# Patient Record
Sex: MALE | Race: ASIAN | Employment: STUDENT | URBAN - METROPOLITAN AREA
[De-identification: names, ages, dates, MRNs, and addresses within clinical notes are randomized per-mention and may not be internally consistent; named-entity substitution may affect disease eponyms.]

---

## 2024-06-11 RX ORDER — TRANEXAMIC ACID 10 MG/ML
1000 INJECTION, SOLUTION INTRAVENOUS ONCE
OUTPATIENT
Start: 2024-06-27 | End: 2024-06-11

## 2024-06-11 RX ORDER — MULTIVIT-MIN/IRON FUM/FOLIC AC 7.5 MG-4
1 TABLET ORAL DAILY
COMMUNITY

## 2024-06-27 ENCOUNTER — HOSPITAL ENCOUNTER (OUTPATIENT)
Facility: HOSPITAL | Age: 24
Setting detail: HOSPITAL OUTPATIENT SURGERY
Discharge: HOME | End: 2024-06-27
Attending: ORTHOPAEDIC SURGERY | Admitting: ORTHOPAEDIC SURGERY

## 2024-06-27 ENCOUNTER — ANESTHESIA EVENT (OUTPATIENT)
Dept: SURGERY | Facility: HOSPITAL | Age: 24
End: 2024-06-27

## 2024-06-27 ENCOUNTER — ANESTHESIA (OUTPATIENT)
Dept: SURGERY | Facility: HOSPITAL | Age: 24
End: 2024-06-27

## 2024-06-27 ENCOUNTER — APPOINTMENT (OUTPATIENT)
Dept: GENERAL RADIOLOGY | Facility: HOSPITAL | Age: 24
End: 2024-06-27
Attending: ORTHOPAEDIC SURGERY

## 2024-06-27 VITALS
OXYGEN SATURATION: 100 % | HEIGHT: 72 IN | BODY MASS INDEX: 23.84 KG/M2 | RESPIRATION RATE: 18 BRPM | WEIGHT: 176 LBS | HEART RATE: 104 BPM | DIASTOLIC BLOOD PRESSURE: 72 MMHG | TEMPERATURE: 98 F | SYSTOLIC BLOOD PRESSURE: 121 MMHG

## 2024-06-27 DIAGNOSIS — S73.192A TEAR OF LEFT ACETABULAR LABRUM, INITIAL ENCOUNTER: Primary | ICD-10-CM

## 2024-06-27 PROCEDURE — 0SQB4ZZ REPAIR LEFT HIP JOINT, PERCUTANEOUS ENDOSCOPIC APPROACH: ICD-10-PCS | Performed by: ORTHOPAEDIC SURGERY

## 2024-06-27 PROCEDURE — 0QB74ZZ EXCISION OF LEFT UPPER FEMUR, PERCUTANEOUS ENDOSCOPIC APPROACH: ICD-10-PCS | Performed by: ORTHOPAEDIC SURGERY

## 2024-06-27 PROCEDURE — 76000 FLUOROSCOPY <1 HR PHYS/QHP: CPT | Performed by: ORTHOPAEDIC SURGERY

## 2024-06-27 DEVICE — FG, CINCHLOCK SS ANCHOR WITH INSERTER
Type: IMPLANTABLE DEVICE | Site: HIP | Status: FUNCTIONAL
Brand: CINCHLOCK

## 2024-06-27 RX ORDER — ONDANSETRON 2 MG/ML
INJECTION INTRAMUSCULAR; INTRAVENOUS AS NEEDED
Status: DISCONTINUED | OUTPATIENT
Start: 2024-06-27 | End: 2024-06-27 | Stop reason: SURG

## 2024-06-27 RX ORDER — MEPERIDINE HYDROCHLORIDE 25 MG/ML
12.5 INJECTION INTRAMUSCULAR; INTRAVENOUS; SUBCUTANEOUS AS NEEDED
Status: DISCONTINUED | OUTPATIENT
Start: 2024-06-27 | End: 2024-06-27

## 2024-06-27 RX ORDER — SODIUM CHLORIDE, SODIUM LACTATE, POTASSIUM CHLORIDE, CALCIUM CHLORIDE 600; 310; 30; 20 MG/100ML; MG/100ML; MG/100ML; MG/100ML
INJECTION, SOLUTION INTRAVENOUS CONTINUOUS
Status: DISCONTINUED | OUTPATIENT
Start: 2024-06-27 | End: 2024-06-27

## 2024-06-27 RX ORDER — ACETAMINOPHEN 500 MG
1000 TABLET ORAL ONCE AS NEEDED
Status: DISCONTINUED | OUTPATIENT
Start: 2024-06-27 | End: 2024-06-27

## 2024-06-27 RX ORDER — HYDROCODONE BITARTRATE AND ACETAMINOPHEN 5; 325 MG/1; MG/1
2 TABLET ORAL ONCE AS NEEDED
Status: DISCONTINUED | OUTPATIENT
Start: 2024-06-27 | End: 2024-06-27

## 2024-06-27 RX ORDER — DEXAMETHASONE SODIUM PHOSPHATE 4 MG/ML
VIAL (ML) INJECTION AS NEEDED
Status: DISCONTINUED | OUTPATIENT
Start: 2024-06-27 | End: 2024-06-27 | Stop reason: SURG

## 2024-06-27 RX ORDER — NEOSTIGMINE METHYLSULFATE 1 MG/ML
INJECTION, SOLUTION INTRAVENOUS AS NEEDED
Status: DISCONTINUED | OUTPATIENT
Start: 2024-06-27 | End: 2024-06-27 | Stop reason: SURG

## 2024-06-27 RX ORDER — HYDROMORPHONE HYDROCHLORIDE 1 MG/ML
0.4 INJECTION, SOLUTION INTRAMUSCULAR; INTRAVENOUS; SUBCUTANEOUS EVERY 5 MIN PRN
Status: DISCONTINUED | OUTPATIENT
Start: 2024-06-27 | End: 2024-06-27

## 2024-06-27 RX ORDER — HYDROCODONE BITARTRATE AND ACETAMINOPHEN 5; 325 MG/1; MG/1
1 TABLET ORAL ONCE AS NEEDED
Status: DISCONTINUED | OUTPATIENT
Start: 2024-06-27 | End: 2024-06-27

## 2024-06-27 RX ORDER — ONDANSETRON 2 MG/ML
4 INJECTION INTRAMUSCULAR; INTRAVENOUS EVERY 6 HOURS PRN
Status: DISCONTINUED | OUTPATIENT
Start: 2024-06-27 | End: 2024-06-27

## 2024-06-27 RX ORDER — KETOROLAC TROMETHAMINE 30 MG/ML
INJECTION, SOLUTION INTRAMUSCULAR; INTRAVENOUS AS NEEDED
Status: DISCONTINUED | OUTPATIENT
Start: 2024-06-27 | End: 2024-06-27 | Stop reason: SURG

## 2024-06-27 RX ORDER — LABETALOL HYDROCHLORIDE 5 MG/ML
5 INJECTION, SOLUTION INTRAVENOUS EVERY 5 MIN PRN
Status: DISCONTINUED | OUTPATIENT
Start: 2024-06-27 | End: 2024-06-27

## 2024-06-27 RX ORDER — ACETAMINOPHEN 500 MG
1000 TABLET ORAL ONCE
Status: DISCONTINUED | OUTPATIENT
Start: 2024-06-27 | End: 2024-06-27 | Stop reason: HOSPADM

## 2024-06-27 RX ORDER — BUPIVACAINE HYDROCHLORIDE AND EPINEPHRINE 5; 5 MG/ML; UG/ML
INJECTION, SOLUTION EPIDURAL; INTRACAUDAL; PERINEURAL AS NEEDED
Status: DISCONTINUED | OUTPATIENT
Start: 2024-06-27 | End: 2024-06-27 | Stop reason: HOSPADM

## 2024-06-27 RX ORDER — HYDROMORPHONE HYDROCHLORIDE 1 MG/ML
INJECTION, SOLUTION INTRAMUSCULAR; INTRAVENOUS; SUBCUTANEOUS
Status: COMPLETED
Start: 2024-06-27 | End: 2024-06-27

## 2024-06-27 RX ORDER — TRANEXAMIC ACID 10 MG/ML
INJECTION, SOLUTION INTRAVENOUS AS NEEDED
Status: DISCONTINUED | OUTPATIENT
Start: 2024-06-27 | End: 2024-06-27 | Stop reason: SURG

## 2024-06-27 RX ORDER — GLYCOPYRROLATE 0.2 MG/ML
INJECTION, SOLUTION INTRAMUSCULAR; INTRAVENOUS AS NEEDED
Status: DISCONTINUED | OUTPATIENT
Start: 2024-06-27 | End: 2024-06-27 | Stop reason: SURG

## 2024-06-27 RX ORDER — ONDANSETRON 4 MG/1
4 TABLET, FILM COATED ORAL EVERY 8 HOURS PRN
Qty: 16 TABLET | Refills: 0 | Status: SHIPPED | OUTPATIENT
Start: 2024-06-27

## 2024-06-27 RX ORDER — MIDAZOLAM HYDROCHLORIDE 1 MG/ML
INJECTION INTRAMUSCULAR; INTRAVENOUS AS NEEDED
Status: DISCONTINUED | OUTPATIENT
Start: 2024-06-27 | End: 2024-06-27 | Stop reason: SURG

## 2024-06-27 RX ORDER — HYDROCODONE BITARTRATE AND ACETAMINOPHEN 5; 325 MG/1; MG/1
1 TABLET ORAL EVERY 6 HOURS PRN
Qty: 16 TABLET | Refills: 0 | Status: SHIPPED | OUTPATIENT
Start: 2024-06-27

## 2024-06-27 RX ORDER — SCOLOPAMINE TRANSDERMAL SYSTEM 1 MG/1
1 PATCH, EXTENDED RELEASE TRANSDERMAL ONCE
Status: DISCONTINUED | OUTPATIENT
Start: 2024-06-27 | End: 2024-06-27 | Stop reason: HOSPADM

## 2024-06-27 RX ORDER — HYDROMORPHONE HYDROCHLORIDE 1 MG/ML
0.2 INJECTION, SOLUTION INTRAMUSCULAR; INTRAVENOUS; SUBCUTANEOUS EVERY 5 MIN PRN
Status: DISCONTINUED | OUTPATIENT
Start: 2024-06-27 | End: 2024-06-27

## 2024-06-27 RX ORDER — PROCHLORPERAZINE EDISYLATE 5 MG/ML
INJECTION INTRAMUSCULAR; INTRAVENOUS
Status: COMPLETED
Start: 2024-06-27 | End: 2024-06-27

## 2024-06-27 RX ORDER — ASPIRIN 325 MG
325 TABLET, DELAYED RELEASE (ENTERIC COATED) ORAL DAILY
Qty: 21 TABLET | Refills: 0 | Status: SHIPPED | OUTPATIENT
Start: 2024-06-27

## 2024-06-27 RX ORDER — ROCURONIUM BROMIDE 10 MG/ML
INJECTION, SOLUTION INTRAVENOUS AS NEEDED
Status: DISCONTINUED | OUTPATIENT
Start: 2024-06-27 | End: 2024-06-27 | Stop reason: SURG

## 2024-06-27 RX ORDER — NAPROXEN 500 MG/1
500 TABLET ORAL 2 TIMES DAILY WITH MEALS
Qty: 42 TABLET | Refills: 1 | Status: SHIPPED | OUTPATIENT
Start: 2024-06-27

## 2024-06-27 RX ORDER — LOSARTAN POTASSIUM 25 MG/1
25 TABLET ORAL DAILY
Qty: 14 TABLET | Refills: 0 | Status: SHIPPED | OUTPATIENT
Start: 2024-06-27

## 2024-06-27 RX ORDER — PROCHLORPERAZINE EDISYLATE 5 MG/ML
5 INJECTION INTRAMUSCULAR; INTRAVENOUS EVERY 8 HOURS PRN
Status: DISCONTINUED | OUTPATIENT
Start: 2024-06-27 | End: 2024-06-27

## 2024-06-27 RX ORDER — HYDROMORPHONE HYDROCHLORIDE 1 MG/ML
0.6 INJECTION, SOLUTION INTRAMUSCULAR; INTRAVENOUS; SUBCUTANEOUS EVERY 5 MIN PRN
Status: DISCONTINUED | OUTPATIENT
Start: 2024-06-27 | End: 2024-06-27

## 2024-06-27 RX ORDER — MIDAZOLAM HYDROCHLORIDE 1 MG/ML
1 INJECTION INTRAMUSCULAR; INTRAVENOUS EVERY 5 MIN PRN
Status: DISCONTINUED | OUTPATIENT
Start: 2024-06-27 | End: 2024-06-27

## 2024-06-27 RX ORDER — NALOXONE HYDROCHLORIDE 0.4 MG/ML
0.08 INJECTION, SOLUTION INTRAMUSCULAR; INTRAVENOUS; SUBCUTANEOUS AS NEEDED
Status: DISCONTINUED | OUTPATIENT
Start: 2024-06-27 | End: 2024-06-27

## 2024-06-27 RX ORDER — LIDOCAINE HYDROCHLORIDE 10 MG/ML
INJECTION, SOLUTION EPIDURAL; INFILTRATION; INTRACAUDAL; PERINEURAL AS NEEDED
Status: DISCONTINUED | OUTPATIENT
Start: 2024-06-27 | End: 2024-06-27 | Stop reason: SURG

## 2024-06-27 RX ADMIN — MIDAZOLAM HYDROCHLORIDE 2 MG: 1 INJECTION INTRAMUSCULAR; INTRAVENOUS at 13:38:00

## 2024-06-27 RX ADMIN — SODIUM CHLORIDE, SODIUM LACTATE, POTASSIUM CHLORIDE, CALCIUM CHLORIDE: 600; 310; 30; 20 INJECTION, SOLUTION INTRAVENOUS at 13:38:00

## 2024-06-27 RX ADMIN — ROCURONIUM BROMIDE 50 MG: 10 INJECTION, SOLUTION INTRAVENOUS at 13:50:00

## 2024-06-27 RX ADMIN — SODIUM CHLORIDE, SODIUM LACTATE, POTASSIUM CHLORIDE, CALCIUM CHLORIDE: 600; 310; 30; 20 INJECTION, SOLUTION INTRAVENOUS at 16:40:00

## 2024-06-27 RX ADMIN — TRANEXAMIC ACID 1000 MG: 10 INJECTION, SOLUTION INTRAVENOUS at 14:23:00

## 2024-06-27 RX ADMIN — GLYCOPYRROLATE 0.6 MG: 0.2 INJECTION, SOLUTION INTRAMUSCULAR; INTRAVENOUS at 16:10:00

## 2024-06-27 RX ADMIN — NEOSTIGMINE METHYLSULFATE 4 MG: 1 INJECTION, SOLUTION INTRAVENOUS at 16:10:00

## 2024-06-27 RX ADMIN — KETOROLAC TROMETHAMINE 30 MG: 30 INJECTION, SOLUTION INTRAMUSCULAR; INTRAVENOUS at 16:11:00

## 2024-06-27 RX ADMIN — ONDANSETRON 4 MG: 2 INJECTION INTRAMUSCULAR; INTRAVENOUS at 15:53:00

## 2024-06-27 RX ADMIN — DEXAMETHASONE SODIUM PHOSPHATE 8 MG: 4 MG/ML VIAL (ML) INJECTION at 13:55:00

## 2024-06-27 RX ADMIN — LIDOCAINE HYDROCHLORIDE 30 MG: 10 INJECTION, SOLUTION EPIDURAL; INFILTRATION; INTRACAUDAL; PERINEURAL at 13:50:00

## 2024-06-27 NOTE — DISCHARGE INSTRUCTIONS
Paul Robrets MD  [facility]  Orthopaedic Surgery - Sports Medicine      Post Operative Instructions: Hip Arthroscopy with Labral Repair or Reconstruction    WEIGHT BEARING / MOVEMENT  You are FLAT FOOT weight bearing for the first 3 weeks after surgery and then 50% weight bearing for the 4th week; it is required that you use crutches for 4 weeks post-operatively to provide you with extra stability, to protect your hip, and to give the muscles around the hip the ability to rest. Do not hyperextend or hyperflex your hip, as this will be painful. Only move your hip within a pain-free range of motion.      The day after surgery, if you have access to a stationary bike, we encourage you to provide gentle motion to the hip. You may ride a stationary bike twice a day for 5-10 minutes (no resistance). This is only to provide motion to the hip and is not intended to stress the muscles around the hip girdle. Keep hip flexion to less than 90 degrees during these exercises.     Physical therapy will begin 7-10 days post-op after your first post-op visit.    ICE  You should use ice packs over the surgical site regularly throughout the day to help decrease swelling and pain. Make sure to have a layer between the ice and your knee so as to not injury your skin. Ice should be applied in intervals of 20-30 minutes on and off, and should be applied regularly for a minimum of 2 weeks following surgery. Do not apply ice to the area when you are asleep or at risk of falling asleep as     MEDICATIONS  An injection of local anesthesia was injected into your hip after the completion of the operation. This medication will wear in 6-12 hours. To control your pain during the transition while the anesthetic is wearing off, you should start the use of your pain medication, Norco, as you feel is needed.      You have also been given an anti-inflammatory called Naprosyn. You should begin taking this medication the night of surgery. You  are to take 500mg (1 tablet) two times a day. This medication serves two purposes: it will help reduce the amount of narcotic pain medication needed post-operatively, and it will prevent bone from re-growing (heterotopic ossification) around the hip. You are to take this medication for the first 21 days post-op.    A low dose of losartan has been prescribed to reduce the risk of post-operative fibrosis (scar tissue). This should be taken once daily for the first 2 weeks post-op.    Take Aspirin 325mg once daily starting the day after surgery for 3 weeks to help prevent the formation of blood clots.    If your are still having severe pain despite the Naprosyn, begin taking the pain medication (Norco) as prescribed.    An anti-nausea medication, Zofran, was prescribed for you and should be taken on an as needed basis, as the pain medication can cause nausea. To minimize this side effect, you should take your pain medication with some food.    DRESSING / BANDAGES:  Your hip dressing is NOT waterproof. Three days after surgery you may remove the hip dressing, and cover the areas with waterproof band-aids. It is then okay to shower with the waterproof band-aids. Do not scrub or wash with soap the area around the incisions for 3 weeks following surgery, just let water run over the areas. Similarly, during these 3 weeks, do not apply lotions or creams to the area around your incisions.     Do not take a bath or submerge your knee in water until your incisions are checked by me at your post-operative appointment and fully healed with all stitches removed. This is typically 2-3 weeks after surgery.    TEMPERATURE  It is normal to have an elevated temperature during the first 2-3 days post-operatively. Please call our office if your temperature is above 101F, if there is increased redness around the incision sites, or if there is increased drainage from the incision sites.    DRIVING  You may drive 2-3 weeks after surgery if  you are not taking narcotic pain medication. If your right leg is the operative side, then you must have good control of your leg prior to driving.    APPOINTMENT  It is likely that my surgical scheduler, Lou, has already scheduled a post-operative visit for you. This should occur 10-14 days after surgery. At that visit you will be given a prescription for physical therapy.    You make experience some low back pain due to muscle spasm from the traction applied during positioning to be able to perform your hip surgery. If so, apply a heating pad to the area of discomfort.    If you have any difficulty using the anti-inflammatory medications or aspirin or have a history of peptic ulcer disease, please let me know.    You received a drug called Toradol which is an Anti Inflammatory at: 4:10pm  If you are allowed to take Anti inflammatories:    Do not take any Anti Inflammatory like Motrin, Aleve or Ibuprofen until after: 10:10pm  Please report any suspected allergic reactions or bleeding issues to your doctor

## 2024-06-27 NOTE — ANESTHESIA POSTPROCEDURE EVALUATION
[facility] Мария Cortés Patient Status:  Hospital Outpatient Surgery   Age/Gender 24 year old male MRN [id number]   Location [facility] SURGERY Attending Paul Roberts MD   Hosp Day # 0 PCP Keron Deleon MD       Anesthesia Post-op Note    LEFT HIP ARTHROSCOPY, LABRAL REPAIR, FEMORAL NECK OSTEOPLASTY, CAPSULAR CLOSURE    Procedure Summary       Date: 06/27/24 Room / Location:  MAIN OR [address] [place] [address] MAIN OR    Anesthesia Start: 1338 Anesthesia Stop: 1640    Procedure: LEFT HIP ARTHROSCOPY, LABRAL REPAIR, FEMORAL NECK OSTEOPLASTY, CAPSULAR CLOSURE (Left: Hip) Diagnosis: (TORN ACETABULUM LEFT HIP; FEMORO-ACETABULAR IMPINGEMENT LEFT HIP)    Surgeons: Paul Roberts MD Anesthesiologist: John Kaiser MD    Anesthesia Type: general ASA Status: 1            Anesthesia Type: general    Vitals Value Taken Time   /67 06/27/24 1643   Temp 98.4 06/27/24 1643   Pulse 98 06/27/24 1643   Resp 16 06/27/24 1643   SpO2 100 06/27/24 1643       Patient Location: PACU    Anesthesia Type: general    Airway Patency: extubated    Postop Pain Control: adequate    Mental Status: mildly sedated but able to meaningfully participate in the post-anesthesia evaluation    Nausea/Vomiting: none    Cardiopulmonary/Hydration status: stable euvolemic    Complications: no apparent anesthesia related complications    Postop vital signs: stable    Dental Exam: Unchanged from Preop

## 2024-06-27 NOTE — H&P
ORTHOPEDIC SURGERY H&P     Patient Name:Rogelio Cortés  Date of Appointment: 6/27/2024    Chief Complaint: Patient presents with:  Left Hip - Follow - Up  Right Hip - Pain    HPI:   Rogelio Cortés is a 24 year old male who presents for evaluation of left hip pain that has been present for 6 months. Patient states that the pain began without injury or inciting incident. Since onset, symptoms have worsened. He notes pain predominately in the anterior hip. He notes pain with going up and down stairs and prolonged walking as well as attempted return to hiking . He denies significant component of associated back pain. He denies any associated radiating pain or nerve type symptoms down the ipsilateral or contralateral extremity distal to the knee. He endorses feelings of clicking or popping around the hip. He has attempted treatment with oral OTC antiinflammatory medication, oral prescription antiinflammatory medication, tylenol, physical therapy, and home exercise regimen. He has had MRI of the affected hip.    Interval history:  Patient comes in today for repeat evaluation of the left hip. He states that he had about 2 weeks of greater than 50% symptom relief following prior ultrasound-guided corticosteroid injection to the left hip. He has since had recurrence of symptoms and feels he has had no persistent long-lasting improvement in symptoms since onset now approximately 10 months ago. Also feels he has had worsening right-sided hip pain. This is predominantly an anterior and C sign distribution. This has been worsening over the past 6 months. He has been doing physical therapy as well as the oral anti-inflammatories which she has been taking for the contralateral hip without benefit. He does not have dedicated advanced imaging to the right hip.    Past Medical History  History reviewed. No pertinent past medical history.    Past Surgical History  Past Surgical History:   Procedure Laterality Date   TONSILLECTOMY      Medications    Current Outpatient Medications:   Meloxicam 15 MG Oral Tab, Take 1 tablet (15 mg total) by mouth daily., Disp: 30 tablet, Rfl: 0  Multiple Vitamins-Minerals (MULTIVITAMIN ADULTS OR), Take by mouth., Disp: , Rfl:   Adapalene (DIFFERIN) 0.3 % External Gel, Apply to face every night, rice sized amount mix with moisturizing cream., Disp: 45 g, Rfl: 3  ibuprofen 100 MG/5ML Oral Suspension, Take 200 mg by mouth every 4 (four) hours as needed for Fever., Disp: , Rfl:     Allergies    Latex HIVES, RASH  Augmentin [Amoxicil* RASH    Social History  Social History  Tobacco Use  Smoking status: Never  Smokeless tobacco: Not on file  Alcohol use: No  Drug use: No      Family History:  Family History   Problem Relation Age of Onset   Other (Other[other]) Mother   migrains   Diabetes Mother   Heart Disorder Maternal Grandmother   Heart Disorder Paternal Grandmother   Hypertension Paternal Grandmother   Heart Disorder Paternal Grandfather   Hypertension Paternal Grandfather     Review of Systems:   Constitutional: Denies fever, chills or weight loss   Allergies: As described in allergies  HEENT: Denies sore throat or acute eye problems  Respiratory: Denies shortness of breath   Cardiovascular: Denies chest pain or palpitations  GI: Denies abdominal pain, nausea  Skin: Denies rash   Neurologic: Denies headache or other problems  Musculoskeletal: As described in HPI  14 System Review of Systems otherwise negative     Physical Exam:     There were no vitals taken for this visit.     Constitutional: No acute distress. Well-nourished. Well-developed.  Head/Face: Normal appearance. Normocephalic. Atraumatic.  Respiratory: Normal Effort  Cardiovascular: warm, well-perfused distal extremities  Neurological: Oriented to time, place, and situation.  Psych: Normal mood, appropriate affect.    Musculoskeletal:    Hip Exam:  1/29/2024 1/29/2024    RIGHT Hip LEFT Hip   Atrophy None None   Skin Clean, dry, and intact. No  erythema or ecchymosis Clean, dry, and intact. No erythema or ecchymosis   Range of Motion 0 to 110, IR 10, ER 40 0 to 110, IR 10, ER 40   Tenderness  None None   Strength Normal Normal  Pain with resisted iliopsoas firing   Impingement Tests FE: Negative  FADDIR:Positive FE: Negative  FADDIR: Positive   Stinchfield Test Negative Negative   Rajwinder's Test Negative Negative   Israel Test Negative Negative   Sports hernia testing Not Tested Not Tested     Skin: Normal except where indicated above  Pulses: Symmetric palpable pulses distally unless indicated above  Lymph: No palpable lymph nodes on examined extremities    Diagnostic Studies:     3 views of the left hip including AP Pelvis, AP hip and lateral views demonstrate no acute fracture or dislocation. There is well-preserved femoroacetabular joint space without evidence of degenerative changes. There is evidence of femoroacetabular impingement, CAM type. Lateral center-edge angle of 26 degrees, Tonnis angle of 5 degrees, 4 mm of remaining femoroacetabular joint space, Alpha angle of 61 degrees. Tonnis grade 0 changes.    3 views of the right hip including AP Pelvis, lateral and false profile views demonstrate no acute fracture or dislocation. There is well-preserved femoroacetabular joint space without evidence of degenerative changes. There is evidence of femoroacetabular impingement, CAM type. Lateral center-edge angle of 25 degrees, Anterior center-edge angle of 31 degrees, Tonnis angle of 3 degrees, 5 mm of remaining femoroacetabular joint space, Alpha angle of 67 degrees. Tonnis grade 0 changes.    MRI of the left hip dated 1/6/2024 reviewed today demonstrates well-appearing femoral acetabular joint without evidence of degenerative change. There is small area of the anterior superior labrum concerning for possible partial-thickness undersurface labral tear only seen in 2 cuts without evidence of full-thickness extension. Well-appearing hip girdle  muscular structure without evidence of full-thickness tear or degenerative atrophy.    Assessment:     24 year old male presents with the following diagnoses:    1. Tear of left acetabular labrum, initial encounter    2. Femoroacetabular impingement of left hip    3. Femoroacetabular impingement of right hip    4. Pain of right hip  - XR HIP W OR WO PELVIS 2 OR 3 VIEWS, RIGHT (CPT=73502); Future  - MRI HIP ARTHROGRAM(CONTRAST ONLY), RT SH(CPT=73722); Future  - XR HIP ARTHROGRAM W/MRI RIGHT (CPT=27093/29439); Future    Plan:     Plan:   - To OR for left hip arthroscopy, labral repair, femoral neck osteoplasty, capsular closure    Paul Roberts MD  [facility]  Orthopedic Surgery, Sports Medicine

## 2024-06-27 NOTE — ANESTHESIA PROCEDURE NOTES
Airway  Date/Time: 6/27/2024 1:51 PM  Urgency: elective    Airway not difficult    General Information and Staff    Patient location during procedure: OR  Anesthesiologist: John Kaiser MD  Performed: anesthesiologist   Performed by: John Kaiser MD  Authorized by: John Kaiser MD      Indications and Patient Condition  Indications for airway management: anesthesia  Sedation level: deep  Preoxygenated: yes  Patient position: sniffing  Mask difficulty assessment: 1 - vent by mask    Final Airway Details  Final airway type: endotracheal airway      Successful airway: ETT  Cuffed: yes   Successful intubation technique: direct laryngoscopy  Endotracheal tube insertion site: oral  Blade: Reece  Blade size: #3  ETT size (mm): 7.5    Cormack-Lehane Classification: grade I - full view of glottis  Placement verified by: capnometry   Measured from: teeth  ETT to teeth (cm): 22  Number of attempts at approach: 1

## 2024-06-27 NOTE — ANESTHESIA PREPROCEDURE EVALUATION
PRE-OP EVALUATION    Patient Name: Rogelio Cortés    Admit Diagnosis: TORN ACETABULUM LEFT HIP; FEMORO-ACETABULAR IMPINGEMENT LEFT HIP    Pre-op Diagnosis: TORN ACETABULUM LEFT HIP; FEMORO-ACETABULAR IMPINGEMENT LEFT HIP    LEFT HIP ARTHROSCOPY, LABRAL REPAIR, FEMORAL NECK OSTEOPLASTY, CAPSULAR CLOSURE    Anesthesia Procedure: LEFT HIP ARTHROSCOPY, LABRAL REPAIR, FEMORAL NECK OSTEOPLASTY, CAPSULAR CLOSURE (Left)    Surgeons and Role:     * Paul Roberts MD - Primary    Pre-op vitals reviewed.        Body mass index is 21.7 kg/m².    Current medications reviewed.  Hospital Medications:  No current facility-administered medications on file as of .       Outpatient Medications:     No medications prior to admission.       Allergies: Latex and Augmentin [amoxicillin-pot clavulanate]      Anesthesia Evaluation    Patient summary reviewed.    Anesthetic Complications  (-) history of anesthetic complications         GI/Hepatic/Renal                                 Cardiovascular        Exercise tolerance: good     MET: >4                                           Endo/Other                                  Pulmonary                           Neuro/Psych                   (+) headaches           Migraines  Hx of motion sickness            Past Surgical History:   Procedure Laterality Date    Adenoidectomy      Tonsillectomy      [place] teeth removed       Social History     Socioeconomic History    Marital status: Single   Tobacco Use    Smoking status: Never   Vaping Use    Vaping status: Never Used   Substance and Sexual Activity    Alcohol use: No    Drug use: No     History   Drug Use No     Available pre-op labs reviewed.               Airway      Mallampati: I  Mouth opening: >3 FB  TM distance: 4 - 6 cm  Neck ROM: full Cardiovascular      Rhythm: regular  Rate: normal     Dental    Dentition appears grossly intact         Pulmonary      Breath sounds clear to auscultation bilaterally.               Other findings               ASA: 1   Plan: general  NPO status verified and patient meets guidelines.    Post-procedure pain management plan discussed with surgeon and patient.      Plan/risks discussed with: patient, father and mother                Present on Admission:  **None**

## 2024-06-28 NOTE — BRIEF OP NOTE
Pre-Operative Diagnosis: TORN ACETABULUM LEFT HIP; FEMORO-ACETABULAR IMPINGEMENT LEFT HIP     Post-Operative Diagnosis: TORN ACETABULUM LEFT HIP; FEMORO-ACETABULAR IMPINGEMENT LEFT HIP      Procedure Performed:   LEFT HIP ARTHROSCOPY, LABRAL REPAIR, FEMORAL NECK OSTEOPLASTY, CAPSULAR CLOSURE (95820, [id number])    Surgeons and Role:     * Paul Roberts MD - Primary    Assistant(s):  PA: Wayne Laura PA-C     Surgical Findings: See detailed operative report     Specimen: None     Estimated Blood Loss: Blood Output: 25 mL (6/27/2024  4:11 PM)    Paul Roberts MD  6/27/2024  9:01 PM

## 2024-06-28 NOTE — OPERATIVE REPORT
Pre-Operative Diagnosis: LEFT HIP IMPINGEMENT SYNDROME, TEAR OF ACETABULAR LABRUM LEFT HIP     Post-Operative Diagnosis: LEFT HIP IMPINGEMENT SYNDROME, TEAR OF ACETABULAR LABRUM LEFT HIP      Procedure Performed:   LEFT HIP ARTHROSCOPY, LABRAL REPAIR, FEMORAL NECK OSTEOPLASTY, CAPSULAR CLOSURE (02809, [id number])     Surgeon(s) and Role:     * Paul Roberts MD - Primary     Assistant(s):  PA: Wayne Laura PA-C     Skilled assistance was needed for patient positioning, prepping and draping, instrument holding and passing, retracting, and suturing.     Specimen: None     Estimated Blood Loss: Blood Output: 25 mL      Indications for Procedure:  Rogelio Cortés is a 24 year old male with 8 months of left hip pain that was found on radiographs to have well-preserved joint space significant and evidence of femoral acetabular impingement with small left sided cam deformity.  An MRI demonstrated the presence of the femoral acetabular impingement as well as a tear of the left acetabular labrum and well preserved chondral surfaces within the hip joint.  Rogelio had previously exhausted conservative measures including activity modification as well as physical therapy without improvement in his hip symptoms. We discussed risks, benefits, and alternatives to surgical intervention and the patient wished to proceed with right hip arthroscopic labral repair, femoral neck osteoplasty, and capsular closure.  Informed consent was obtained.     Description of Procedure:  The patient was met in the preoperative holding area where correct patient, correct site, correct side, correct procedure were all confirmed.  The patient was placed under anesthesia and placed in a supine position on the [place] orthopaedic table.  Preoperative antibiotics were given.  A preoperative timeout was completed to WHO standards. All bony prominences were padded.  Traction was placed onto the nonoperative leg in a stabilizing position then gross traction was  applied to the right lower extremity.  An air arthrogram was performed under sterile conditions and confirmed under intraoperative fluoroscopy with atraumatic distention of the joint visualized.  Final traction was then applied with greater than 10 mm of joint distention visualized fluoroscopically.  The hip was then prepped and draped in the usual sterile surgical fashion and shower curtain dressing applied to the operative side.  A standard anterior lateral portal was then used to establish access into the joint under fluoroscopic guidance so as to confirm that the labrum was not violated.  The Semasio cannulated access system was then used and a 5.0 mm cannula was inserted over Nitinol guidewire under fluoroscopic guidance.  The Nitinol wire was removed and found to be intact.  70 degree arthroscope was then inserted and the joint surface visualized.  A mid anterior portal was then established under fluoroscopic guidance with direct visualization as the needle entered the joint.  A 5.0 mm cannula was then inserted over Nitinol guidewire for establishment of the mid anterior portal.  The camera was placed into the mid anterior portal and anterior lateral portal placement was confirmed to be through the capsule and outside of the acetabular labrum.  Camera was then brought back to the anterior lateral portal and samurai blade was inserted into the mid anterior portal for interportal capsulotomy.  The camera was then exchanged and the samurai placed into the anterior lateral portal for pericapsulotomy.  A labral tear was then visualized in the 1:00 to 3 o'clock position without associated injury to the chondral surface of the acetabular dome at the chondral labral junction.  Shaver was introduced and the labral tear was debrided down to a stable base.  Injector was then used to place retraction stitches in the acetabular leaflet of the incised capsule. The ablator was then introduced and the superior reflection of  the capsular leaflet was reflected off of the superior acetabulum adjacent to the labrum.     At this time attention was turned to the labral repair.  A distal anterior lateral accessory portal was then established under needle localization and fluoroscopic guidance.  A access cannula was then introduced over Nitinol wire via [place]v cannulated access system.  A 1.0 mm labral tape was then passed circumferentially around the labrum and without destabilizing the labral chondral junction.  This was placed into an anchor, and anchor socket was then drilled adjacent to the labral tape superiorly in the anterior acetabulum under direct visualization with no noted injury to the acetabular cartilage surface.  The anchor was then introduced into the joint and secured in place with appropriate eversion of the acetabular labrum.  This process was completed sequentially and a 3 anchor repair was performed. At this time the labrum was deemed to be appropriately repaired and peripheral borders were stable.  Final debridement of the chondral labral junction was completed with the use of shaver and electrocautery.  Traction was let down and appropriate suction seal was confirmed.  Total traction time was 58 minutes.      Attention was then turned to the femoral neck osteoplasty.  With the use of 5.0 mm round bur and intraoperative fluoroscopic guidance the hip was taken through a range of motion and a femoral neck was debrided back down to an appropriate level and contour to reestablish the appropriate spherical femoral orientation.  Evaluation was completed at sequential positions of 0 degrees of flexion and neutral rotation, 30 degrees of internal and external rotation, 50 degrees of flexion and neutral rotation, 50 degrees of flexion and 40 and 60 degrees of external rotation.  Upon completion of the resection the appropriate head neck offset and contour had been reestablished in all planes and normal alpha angle was achieved.   Attention was then turned to capsular closure.  The injector was then used to place simple stitches in the acetabular and femoral leaflets of the prior periportal capsulotomy and closed in sequential fashion. The joint was then suctioned and capsular incisions were injected with 0.25% Marcaine with epinephrine.  Incisions were then closed with 3-0 nylon in interrupted fashion and sterile dressings were placed.  The patient was awakened from anesthesia, transferred to the postoperative care unit and left the operating room in stable condition.  There were no complications.    Paul Roberts MD  06/27/2024

## 2024-09-24 PROBLEM — M25.552 JOINT PAIN OF LEFT HIP ON MOVEMENT: Status: ACTIVE | Noted: 2023-11-09

## 2024-09-24 PROBLEM — L70.0 ACNE VULGARIS: Status: ACTIVE | Noted: 2021-12-22

## 2024-09-24 PROBLEM — G43.909 MIGRAINE: Status: ACTIVE | Noted: 2021-12-22

## 2024-09-24 PROBLEM — M25.559 PAIN IN JOINT, PELVIC REGION AND THIGH: Status: ACTIVE | Noted: 2023-12-28

## 2024-09-24 PROBLEM — S73.192A TEAR OF LEFT ACETABULAR LABRUM: Status: ACTIVE | Noted: 2024-03-22

## 2024-10-10 ENCOUNTER — APPOINTMENT (OUTPATIENT)
Dept: GENERAL RADIOLOGY | Facility: HOSPITAL | Age: 24
End: 2024-10-10
Attending: ORTHOPAEDIC SURGERY
Payer: COMMERCIAL

## 2024-10-10 ENCOUNTER — HOSPITAL ENCOUNTER (OUTPATIENT)
Facility: HOSPITAL | Age: 24
Setting detail: HOSPITAL OUTPATIENT SURGERY
Discharge: HOME | End: 2024-10-10
Attending: ORTHOPAEDIC SURGERY | Admitting: ORTHOPAEDIC SURGERY
Payer: COMMERCIAL

## 2024-10-10 ENCOUNTER — ANESTHESIA (OUTPATIENT)
Dept: SURGERY | Facility: HOSPITAL | Age: 24
End: 2024-10-10
Payer: COMMERCIAL

## 2024-10-10 ENCOUNTER — ANESTHESIA EVENT (OUTPATIENT)
Dept: SURGERY | Facility: HOSPITAL | Age: 24
End: 2024-10-10
Payer: COMMERCIAL

## 2024-10-10 VITALS
HEIGHT: 72 IN | RESPIRATION RATE: 16 BRPM | BODY MASS INDEX: 23.68 KG/M2 | TEMPERATURE: 98 F | OXYGEN SATURATION: 100 % | HEART RATE: 102 BPM | WEIGHT: 174.81 LBS | SYSTOLIC BLOOD PRESSURE: 140 MMHG | DIASTOLIC BLOOD PRESSURE: 93 MMHG

## 2024-10-10 DIAGNOSIS — S73.192A TEAR OF LEFT ACETABULAR LABRUM, INITIAL ENCOUNTER: ICD-10-CM

## 2024-10-10 DIAGNOSIS — S73.191A TEAR OF RIGHT ACETABULAR LABRUM, INITIAL ENCOUNTER: Primary | ICD-10-CM

## 2024-10-10 PROCEDURE — 76000 FLUOROSCOPY <1 HR PHYS/QHP: CPT | Performed by: ORTHOPAEDIC SURGERY

## 2024-10-10 PROCEDURE — 0QB64ZZ EXCISION OF RIGHT UPPER FEMUR, PERCUTANEOUS ENDOSCOPIC APPROACH: ICD-10-PCS | Performed by: ORTHOPAEDIC SURGERY

## 2024-10-10 PROCEDURE — 0QB44ZZ EXCISION OF RIGHT ACETABULUM, PERCUTANEOUS ENDOSCOPIC APPROACH: ICD-10-PCS | Performed by: ORTHOPAEDIC SURGERY

## 2024-10-10 PROCEDURE — 76942 ECHO GUIDE FOR BIOPSY: CPT | Performed by: ANESTHESIOLOGY

## 2024-10-10 DEVICE — FG, CINCHLOCK SS ANCHOR WITH INSERTER
Type: IMPLANTABLE DEVICE | Site: HIP | Status: FUNCTIONAL
Brand: CINCHLOCK

## 2024-10-10 RX ORDER — KETOROLAC TROMETHAMINE 30 MG/ML
INJECTION, SOLUTION INTRAMUSCULAR; INTRAVENOUS AS NEEDED
Status: DISCONTINUED | OUTPATIENT
Start: 2024-10-10 | End: 2024-10-10 | Stop reason: SURG

## 2024-10-10 RX ORDER — HYDROMORPHONE HYDROCHLORIDE 1 MG/ML
0.4 INJECTION, SOLUTION INTRAMUSCULAR; INTRAVENOUS; SUBCUTANEOUS EVERY 5 MIN PRN
Status: DISCONTINUED | OUTPATIENT
Start: 2024-10-10 | End: 2024-10-10

## 2024-10-10 RX ORDER — TRANEXAMIC ACID 10 MG/ML
INJECTION, SOLUTION INTRAVENOUS AS NEEDED
Status: DISCONTINUED | OUTPATIENT
Start: 2024-10-10 | End: 2024-10-10 | Stop reason: SURG

## 2024-10-10 RX ORDER — DEXAMETHASONE SODIUM PHOSPHATE 4 MG/ML
VIAL (ML) INJECTION AS NEEDED
Status: DISCONTINUED | OUTPATIENT
Start: 2024-10-10 | End: 2024-10-10 | Stop reason: SURG

## 2024-10-10 RX ORDER — ROCURONIUM BROMIDE 10 MG/ML
INJECTION, SOLUTION INTRAVENOUS AS NEEDED
Status: DISCONTINUED | OUTPATIENT
Start: 2024-10-10 | End: 2024-10-10 | Stop reason: SURG

## 2024-10-10 RX ORDER — NAPROXEN 500 MG/1
500 TABLET ORAL 2 TIMES DAILY
Qty: 42 TABLET | Refills: 1 | Status: SHIPPED | OUTPATIENT
Start: 2024-10-10

## 2024-10-10 RX ORDER — HYDROMORPHONE HYDROCHLORIDE 1 MG/ML
INJECTION, SOLUTION INTRAMUSCULAR; INTRAVENOUS; SUBCUTANEOUS
Status: DISCONTINUED
Start: 2024-10-10 | End: 2024-10-10 | Stop reason: WASHOUT

## 2024-10-10 RX ORDER — SODIUM CHLORIDE, SODIUM LACTATE, POTASSIUM CHLORIDE, CALCIUM CHLORIDE 600; 310; 30; 20 MG/100ML; MG/100ML; MG/100ML; MG/100ML
INJECTION, SOLUTION INTRAVENOUS CONTINUOUS
Status: DISCONTINUED | OUTPATIENT
Start: 2024-10-10 | End: 2024-10-10

## 2024-10-10 RX ORDER — HYDROCODONE BITARTRATE AND ACETAMINOPHEN 5; 325 MG/1; MG/1
1 TABLET ORAL EVERY 6 HOURS PRN
Qty: 16 TABLET | Refills: 0 | Status: SHIPPED | OUTPATIENT
Start: 2024-10-10

## 2024-10-10 RX ORDER — LABETALOL HYDROCHLORIDE 5 MG/ML
5 INJECTION, SOLUTION INTRAVENOUS EVERY 5 MIN PRN
Status: DISCONTINUED | OUTPATIENT
Start: 2024-10-10 | End: 2024-10-10

## 2024-10-10 RX ORDER — SCOLOPAMINE TRANSDERMAL SYSTEM 1 MG/1
1 PATCH, EXTENDED RELEASE TRANSDERMAL ONCE
Status: DISCONTINUED | OUTPATIENT
Start: 2024-10-10 | End: 2024-10-10

## 2024-10-10 RX ORDER — ONDANSETRON 4 MG/1
4 TABLET, FILM COATED ORAL EVERY 8 HOURS PRN
Qty: 16 TABLET | Refills: 0 | Status: SHIPPED | OUTPATIENT
Start: 2024-10-10

## 2024-10-10 RX ORDER — LIDOCAINE HYDROCHLORIDE 10 MG/ML
INJECTION, SOLUTION EPIDURAL; INFILTRATION; INTRACAUDAL; PERINEURAL AS NEEDED
Status: DISCONTINUED | OUTPATIENT
Start: 2024-10-10 | End: 2024-10-10 | Stop reason: SURG

## 2024-10-10 RX ORDER — HYDROCODONE BITARTRATE AND ACETAMINOPHEN 5; 325 MG/1; MG/1
1 TABLET ORAL ONCE AS NEEDED
Status: COMPLETED | OUTPATIENT
Start: 2024-10-10 | End: 2024-10-10

## 2024-10-10 RX ORDER — HYDROCODONE BITARTRATE AND ACETAMINOPHEN 5; 325 MG/1; MG/1
2 TABLET ORAL ONCE AS NEEDED
Status: COMPLETED | OUTPATIENT
Start: 2024-10-10 | End: 2024-10-10

## 2024-10-10 RX ORDER — ONDANSETRON 2 MG/ML
4 INJECTION INTRAMUSCULAR; INTRAVENOUS EVERY 6 HOURS PRN
Status: DISCONTINUED | OUTPATIENT
Start: 2024-10-10 | End: 2024-10-10

## 2024-10-10 RX ORDER — ACETAMINOPHEN 500 MG
1000 TABLET ORAL ONCE
Status: DISCONTINUED | OUTPATIENT
Start: 2024-10-10 | End: 2024-10-10 | Stop reason: HOSPADM

## 2024-10-10 RX ORDER — ALBUTEROL SULFATE 0.83 MG/ML
2.5 SOLUTION RESPIRATORY (INHALATION) AS NEEDED
Status: DISCONTINUED | OUTPATIENT
Start: 2024-10-10 | End: 2024-10-10

## 2024-10-10 RX ORDER — ACETAMINOPHEN 500 MG
1000 TABLET ORAL ONCE AS NEEDED
Status: COMPLETED | OUTPATIENT
Start: 2024-10-10 | End: 2024-10-10

## 2024-10-10 RX ORDER — NEOSTIGMINE METHYLSULFATE 1 MG/ML
INJECTION INTRAVENOUS AS NEEDED
Status: DISCONTINUED | OUTPATIENT
Start: 2024-10-10 | End: 2024-10-10 | Stop reason: SURG

## 2024-10-10 RX ORDER — MEPERIDINE HYDROCHLORIDE 25 MG/ML
12.5 INJECTION INTRAMUSCULAR; INTRAVENOUS; SUBCUTANEOUS ONCE
Status: COMPLETED | OUTPATIENT
Start: 2024-10-10 | End: 2024-10-10

## 2024-10-10 RX ORDER — MEPERIDINE HYDROCHLORIDE 25 MG/ML
INJECTION INTRAMUSCULAR; INTRAVENOUS; SUBCUTANEOUS
Status: COMPLETED
Start: 2024-10-10 | End: 2024-10-10

## 2024-10-10 RX ORDER — DEXAMETHASONE SODIUM PHOSPHATE 10 MG/ML
INJECTION, SOLUTION INTRAMUSCULAR; INTRAVENOUS AS NEEDED
Status: DISCONTINUED | OUTPATIENT
Start: 2024-10-10 | End: 2024-10-10 | Stop reason: SURG

## 2024-10-10 RX ORDER — MIDAZOLAM HYDROCHLORIDE 1 MG/ML
INJECTION INTRAMUSCULAR; INTRAVENOUS AS NEEDED
Status: DISCONTINUED | OUTPATIENT
Start: 2024-10-10 | End: 2024-10-10 | Stop reason: SURG

## 2024-10-10 RX ORDER — HYDROMORPHONE HYDROCHLORIDE 1 MG/ML
0.6 INJECTION, SOLUTION INTRAMUSCULAR; INTRAVENOUS; SUBCUTANEOUS EVERY 5 MIN PRN
Status: DISCONTINUED | OUTPATIENT
Start: 2024-10-10 | End: 2024-10-10

## 2024-10-10 RX ORDER — ASPIRIN 325 MG
325 TABLET, DELAYED RELEASE (ENTERIC COATED) ORAL DAILY
Qty: 21 TABLET | Refills: 0 | Status: SHIPPED | OUTPATIENT
Start: 2024-10-10

## 2024-10-10 RX ORDER — HYDROMORPHONE HYDROCHLORIDE 1 MG/ML
0.2 INJECTION, SOLUTION INTRAMUSCULAR; INTRAVENOUS; SUBCUTANEOUS EVERY 5 MIN PRN
Status: DISCONTINUED | OUTPATIENT
Start: 2024-10-10 | End: 2024-10-10

## 2024-10-10 RX ORDER — GLYCOPYRROLATE 0.2 MG/ML
INJECTION, SOLUTION INTRAMUSCULAR; INTRAVENOUS AS NEEDED
Status: DISCONTINUED | OUTPATIENT
Start: 2024-10-10 | End: 2024-10-10 | Stop reason: SURG

## 2024-10-10 RX ORDER — BUPIVACAINE HYDROCHLORIDE AND EPINEPHRINE 2.5; 5 MG/ML; UG/ML
INJECTION, SOLUTION EPIDURAL; INFILTRATION; INTRACAUDAL; PERINEURAL AS NEEDED
Status: DISCONTINUED | OUTPATIENT
Start: 2024-10-10 | End: 2024-10-10

## 2024-10-10 RX ORDER — BUPIVACAINE HYDROCHLORIDE AND EPINEPHRINE 2.5; 5 MG/ML; UG/ML
INJECTION, SOLUTION EPIDURAL; INFILTRATION; INTRACAUDAL; PERINEURAL AS NEEDED
Status: DISCONTINUED | OUTPATIENT
Start: 2024-10-10 | End: 2024-10-10 | Stop reason: SURG

## 2024-10-10 RX ORDER — LOSARTAN POTASSIUM 25 MG/1
25 TABLET ORAL DAILY
Qty: 14 TABLET | Refills: 0 | Status: SHIPPED | OUTPATIENT
Start: 2024-10-10

## 2024-10-10 RX ORDER — NALOXONE HYDROCHLORIDE 0.4 MG/ML
0.08 INJECTION, SOLUTION INTRAMUSCULAR; INTRAVENOUS; SUBCUTANEOUS AS NEEDED
Status: DISCONTINUED | OUTPATIENT
Start: 2024-10-10 | End: 2024-10-10

## 2024-10-10 RX ORDER — ONDANSETRON 2 MG/ML
INJECTION INTRAMUSCULAR; INTRAVENOUS AS NEEDED
Status: DISCONTINUED | OUTPATIENT
Start: 2024-10-10 | End: 2024-10-10 | Stop reason: SURG

## 2024-10-10 RX ADMIN — TRANEXAMIC ACID 1000 MG: 10 INJECTION, SOLUTION INTRAVENOUS at 07:40:00

## 2024-10-10 RX ADMIN — ONDANSETRON 4 MG: 2 INJECTION INTRAMUSCULAR; INTRAVENOUS at 09:38:00

## 2024-10-10 RX ADMIN — GLYCOPYRROLATE 0.6 MG: 0.2 INJECTION, SOLUTION INTRAMUSCULAR; INTRAVENOUS at 10:17:00

## 2024-10-10 RX ADMIN — BUPIVACAINE HYDROCHLORIDE AND EPINEPHRINE 30 ML: 2.5; 5 INJECTION, SOLUTION EPIDURAL; INFILTRATION; INTRACAUDAL; PERINEURAL at 07:19:00

## 2024-10-10 RX ADMIN — ROCURONIUM BROMIDE 10 MG: 10 INJECTION, SOLUTION INTRAVENOUS at 09:14:00

## 2024-10-10 RX ADMIN — KETOROLAC TROMETHAMINE 30 MG: 30 INJECTION, SOLUTION INTRAMUSCULAR; INTRAVENOUS at 10:17:00

## 2024-10-10 RX ADMIN — MIDAZOLAM HYDROCHLORIDE 2 MG: 1 INJECTION INTRAMUSCULAR; INTRAVENOUS at 07:06:00

## 2024-10-10 RX ADMIN — ROCURONIUM BROMIDE 50 MG: 10 INJECTION, SOLUTION INTRAVENOUS at 07:30:00

## 2024-10-10 RX ADMIN — ROCURONIUM BROMIDE 20 MG: 10 INJECTION, SOLUTION INTRAVENOUS at 08:35:00

## 2024-10-10 RX ADMIN — DEXAMETHASONE SODIUM PHOSPHATE 2 MG: 10 INJECTION, SOLUTION INTRAMUSCULAR; INTRAVENOUS at 07:19:00

## 2024-10-10 RX ADMIN — ROCURONIUM BROMIDE 20 MG: 10 INJECTION, SOLUTION INTRAVENOUS at 07:56:00

## 2024-10-10 RX ADMIN — DEXAMETHASONE SODIUM PHOSPHATE 4 MG: 4 MG/ML VIAL (ML) INJECTION at 07:49:00

## 2024-10-10 RX ADMIN — LIDOCAINE HYDROCHLORIDE 50 MG: 10 INJECTION, SOLUTION EPIDURAL; INFILTRATION; INTRACAUDAL; PERINEURAL at 07:26:00

## 2024-10-10 RX ADMIN — NEOSTIGMINE METHYLSULFATE 3 MG: 1 INJECTION INTRAVENOUS at 10:17:00

## 2024-10-10 NOTE — ANESTHESIA POSTPROCEDURE EVALUATION
[facility] Мария Cortés Patient Status:  Hospital Outpatient Surgery   Age/Gender 24 year old male MRN [id number]   Location [facility] SURGERY Attending Paul Roberts MD   Hosp Day # 0 PCP Keron Deleon MD       Anesthesia Post-op Note    RIGHT HIP ARTHROSCOPY, LABRAL REPAIR, FEMORAL NECK OSTEOPLASTY, CAPSULAR CLOSURE    Procedure Summary       Date: 10/10/24 Room / Location:  MAIN OR 12 / [facility] MAIN OR    Anesthesia Start: 0705 Anesthesia Stop: 1038    Procedure: RIGHT HIP ARTHROSCOPY, LABRAL REPAIR, FEMORAL NECK OSTEOPLASTY, CAPSULAR CLOSURE (Right: Hip) Diagnosis: (TEAR OF ACETABULAR LABRUM RIGHT HIP, FEMOROACETABULAR IMPRINGEMENT RIGHT HIP)    Surgeons: Paul Roberts MD Anesthesiologist: Christopher Telles MD    Anesthesia Type: general ASA Status: 2            Anesthesia Type: general    Vitals Value Taken Time   /58 10/10/24 1038   Temp 97.9 10/10/24 1038   Pulse 77 10/10/24 1038   Resp 18 10/10/24 1038   SpO2 99 10/10/24 1038       Patient Location: PACU    Anesthesia Type: general    Airway Patency: patent, extubated and oral/nasal airway    Postop Pain Control: adequate    Nausea/Vomiting: none    Cardiopulmonary/Hydration status: stable euvolemic    Complications: no apparent anesthesia related complications    Postop vital signs: stable    Dental Exam: Unchanged from Preop    Patient to be discharged from PACU when criteria met.

## 2024-10-10 NOTE — H&P
ORTHOPEDIC SURGERY H&P     Patient Name:Rogelio Cortés  Date of Appointment: 10/10/2024    Chief Complaint: Patient presents with:  Left Hip - Follow - Up    HPI:   Rogelio Cortés is a 24 year old male who presents for evaluation of left hip pain that has been present for 6 months. Patient states that the pain began without injury or inciting incident. Since onset, symptoms have worsened. He notes pain predominately in the anterior hip. He notes pain with going up and down stairs and prolonged walking as well as attempted return to hiking . He denies significant component of associated back pain. He denies any associated radiating pain or nerve type symptoms down the ipsilateral or contralateral extremity distal to the knee. He endorses feelings of clicking or popping around the hip. He has attempted treatment with oral OTC antiinflammatory medication, oral prescription antiinflammatory medication, tylenol, physical therapy, and home exercise regimen. He has had MRI of the affected hip.    Interval history:  Patient comes in today for repeat evaluation of the left hip as well as the right hip. He is now a little over 3 months out from left hip arthroscopic labral repair. Overall he is doing very well from the side. He continues to progress well with therapy. Denies any significant ongoing symptom recurrence in this area. He has had continued worsening pain symptoms on the right side. Is predominantly in a C sign distribution. This has been worsening over the past 10 months. He has been doing physical therapy as well as the oral anti-inflammatories which he has been taking for the right hip without benefit.     Past Medical History  History reviewed. No pertinent past medical history.    Past Surgical History  Past Surgical History:   Procedure Laterality Date   TONSILLECTOMY     Medications    Current Outpatient Medications:   Meloxicam 15 MG Oral Tab, Take 1 tablet (15 mg total) by mouth daily., Disp: 30 tablet,  Rfl: 0  Multiple Vitamins-Minerals (MULTIVITAMIN ADULTS OR), Take by mouth., Disp: , Rfl:   Adapalene (DIFFERIN) 0.3 % External Gel, Apply to face every night, rice sized amount mix with moisturizing cream., Disp: 45 g, Rfl: 3  ibuprofen 100 MG/5ML Oral Suspension, Take 200 mg by mouth every 4 (four) hours as needed for Fever., Disp: , Rfl:     Allergies    Latex HIVES, RASH  Augmentin [Amoxicil* RASH    Social History  Social History  Tobacco Use  Smoking status: Never  Smokeless tobacco: Not on file  Alcohol use: No  Drug use: No      Family History:  Family History   Problem Relation Age of Onset   Other (Other[other]) Mother   migrains   Diabetes Mother   Heart Disorder Maternal Grandmother   Heart Disorder Paternal Grandmother   Hypertension Paternal Grandmother   Heart Disorder Paternal Grandfather   Hypertension Paternal Grandfather     Review of Systems:   Constitutional: Denies fever, chills or weight loss   Allergies: As described in allergies  HEENT: Denies sore throat or acute eye problems  Respiratory: Denies shortness of breath   Cardiovascular: Denies chest pain or palpitations  GI: Denies abdominal pain, nausea  Skin: Denies rash   Neurologic: Denies headache or other problems  Musculoskeletal: As described in HPI  14 System Review of Systems otherwise negative     Physical Exam:     There were no vitals taken for this visit.     Constitutional: No acute distress. Well-nourished. Well-developed.  Head/Face: Normal appearance. Normocephalic. Atraumatic.  Respiratory: Normal Effort  Cardiovascular: warm, well-perfused distal extremities  Neurological: Oriented to time, place, and situation.  Psych: Normal mood, appropriate affect.    Musculoskeletal:    Hip Exam:  9/26/2024 9/26/2024    RIGHT Hip LEFT Hip   Atrophy None None   Skin Clean, dry, and intact. No erythema or ecchymosis Clean, dry, and intact. No erythema or ecchymosis   Range of Motion 0 to 110, IR 10, ER 40 0 to 110, IR 10, ER 40    Tenderness  None None   Strength Normal Normal   Impingement Tests FE: Negative  FADDIR:Positive FE: Negative  FADDIR: Negative   Stinchfield Test Negative Negative   Rajwinder's Test Negative Negative   Israel Test Negative Negative   Sports hernia testing Not Tested Not Tested     Skin: Normal except where indicated above  Pulses: Symmetric palpable pulses distally unless indicated above  Lymph: No palpable lymph nodes on examined extremities    Diagnostic Studies:     3 views of the left hip including AP Pelvis, AP hip and lateral views demonstrate no acute fracture or dislocation. There is well-preserved femoroacetabular joint space without evidence of degenerative changes. There is evidence of femoroacetabular impingement, CAM type. Lateral center-edge angle of 26 degrees, Tonnis angle of 5 degrees, 4 mm of remaining femoroacetabular joint space, Alpha angle of 61 degrees. Tonnis grade 0 changes.    3 views of the right hip including AP Pelvis, lateral and false profile views demonstrate no acute fracture or dislocation. There is well-preserved femoroacetabular joint space without evidence of degenerative changes. There is evidence of femoroacetabular impingement, CAM type. Lateral center-edge angle of 25 degrees, Anterior center-edge angle of 31 degrees, Tonnis angle of 3 degrees, 5 mm of remaining femoroacetabular joint space, Alpha angle of 67 degrees. Tonnis grade 0 changes.    MRI of the left hip dated 1/6/2024 reviewed today demonstrates well-appearing femoral acetabular joint without evidence of degenerative change. There is small area of the anterior superior labrum concerning for possible partial-thickness undersurface labral tear only seen in 2 cuts without evidence of full-thickness extension. Well-appearing hip girdle muscular structure without evidence of full-thickness tear or degenerative atrophy.    MRI hip arthrogram of the right hip dated 5/30/2024 demonstrates tear of the anterior superior  acetabular labrum. There is femoral neck cam lesion visualized with loss of head neck offset. No significant degenerative changes seen in the cartilaginous surfaces of the femoral head or acetabulum. No significant tendinopathy present in the hip muscle girdle insertions.    Assessment:     24 year old male presents with the following diagnoses:    1. Femoroacetabular impingement of right hip  - PT AND/OR OT EVAL & TREAT (DULY); Standing    2. Tear of right acetabular labrum, initial encounter  - PT AND/OR OT EVAL & TREAT (DULY); Standing    3. Status post arthroscopy of hip  - PT AND/OR OT EVAL & TREAT (DULY); Standing    Plan:     Plan:   - To OR for right hip arthroscopy, acetabular labral repair, femoral neck osteoplasty, capsular closure.     Paul Roberts MD  [facility]  Orthopedic Surgery, Sports Medicine

## 2024-10-10 NOTE — ANESTHESIA PROCEDURE NOTES
Regional Block    Date/Time: 10/10/2024 7:10 AM    Performed by: Christopher Telles MD  Authorized by: Christopher Telles MD      General Information and Staff    Start Time:  10/10/2024 7:10 AM  End Time:  10/10/2024 7:20 AM  Anesthesiologist:  Christopher Telles MD  Performed by:  Anesthesiologist  Patient Location:  OR    Block Placement: Pre Induction  Site Identification: real time ultrasound guided and image stored and retrievable    Block site/laterality marked before start: site marked  Reason for Block: at surgeon's request and post-op pain management    Preanesthetic Checklist: 2 patient identifers, IV checked, risks and benefits discussed, monitors and equipment checked, pre-op evaluation, timeout performed, anesthesia consent, sterile technique used, no prohibitive neurological deficits and no local skin infection at insertion site      Procedure Details    Patient Position:  Supine  Prep: ChloraPrep    Monitoring:  Cardiac monitor, continuous pulse ox and blood pressure cuff  Block Type:  Fascia iliaca  Laterality:  Right  Injection Technique:  Single-shot    Needle    Needle Type:  Short-bevel and echogenic  Needle Gauge:  21 G  Needle Length:  100 mm  Needle Localization:  Ultrasound guidance  Reason for Ultrasound Use: appropriate spread of the medication was noted in real time and no ultrasound evidence of intravascular and/or intraneural injection            Assessment    Injection Assessment:  Good spread noted, negative resistance, negative aspiration for heme, incremental injection and low pressure  Heart Rate Change: No    - Patient tolerated block procedure well without evidence of immediate block related complications.     Medications  10/10/2024 7:10 AM      Additional Comments    Medication:  Bupivacaine 0.25% 30mL  with 1:200,000 epi with 2 mg of PF dexamethasone.

## 2024-10-10 NOTE — PROGRESS NOTES
Significant Event - Fall Note    Date/Time of Fall: October 10, 2024 at 1230    Fall huddle completed: Yes    Description of patient fall:     Patient fell from: Standing     Activity when fall occurred: Changing position and Toileting-related activities     Where did fall occur: Patient room     Was the fall assisted: Assisted to floor    Who witnessed the fall: Staff    Patient narrative of fall: \"I was attempting to stand and my knee buckled. I had surgery on the other leg in June and I was not used to putting so much weight in that other leg\"     Staff narrative of fall: Patient was unable to pee while sitting. Instructed patient that I would help him stand at the bedside to pee. Instructed patient that all of his weight needed to go into his left leg. Patient began to stand without instruction and he fell to his knee and was assisted to the ground. Prior to getting up patient had a palpable pulse and was able to feel his leg and lift his leg.     Name of Provider notified of fall: Dr. Roberts notified     Family notification: Family present    Factors contributing to fall:     Physical: Impaired mobility/transfer     Psychological: Alert and Oriented     Environmental: Equipment     Medications received in the past 8 hours:   Medication(s) Administered in past 8 Hours from 10/10/2024 0517 to 10/10/2024 1317       Date/Time Order Dose Route Action Action by Comments    10/10/2024 1059 CDT fentaNYL (Sublimaze) 50 mcg/mL injection 25 mcg 25 mcg Intravenous Given Ginny Mora RN --    10/10/2024 1206 CDT HYDROcodone-acetaminophen (Norco) 5-325 MG per tab 1 tablet 1 tablet Oral Given Mary Schroeder RN --    10/10/2024 0616 CDT lactated ringers infusion -- Intravenous New Bag Mary Schroeder RN --    10/10/2024 1055 CDT meperidine (Demerol) 25 MG/ML injection 12.5 mg 12.5 mg Intravenous Given Ginny Mora RN for post op shivering            Was patient identified as high fall risk prior to fall:                                 What interventions were in place prior to fall: Bed in lowest position, Call light within reach, [place] footwear, Patient/family involved in fall prevention plan, and Personal items within reach    Interventions post fall: Bed in lowest position, [place] footwear, Patient education tool, Patient/family involved in fall prevention plan, Rounding, and Toileting regimen    Additional comments: Patient reported that he did not have a block at his last procedure and did not know how it was going to feel.

## 2024-10-10 NOTE — ANESTHESIA PROCEDURE NOTES
Peripheral IV  Date/Time: 10/10/2024 7:25 AM  Inserted by: Christopher Telles MD    Placement  Needle size: 20 G  Laterality: left  Location: hand  Local anesthetic: none  Site prep: alcohol  Technique: anatomical landmarks  Attempts: 2

## 2024-10-10 NOTE — OPERATIVE REPORT
Pre-Operative Diagnosis: TEAR OF ACETABULAR LABRUM RIGHT HIP, FEMOROACETABULAR IMPRINGEMENT RIGHT HIP     Post-Operative Diagnosis: TEAR OF ACETABULAR LABRUM RIGHT HIP, FEMOROACETABULAR IMPRINGEMENT RIGHT HIP, RIGHT ACETABULAR CHONDROMALACIA      Procedure Performed:   RIGHT HIP ARTHROSCOPY, LABRAL REPAIR, DEBRIDEMENT ACETABULAR CARTILAGE, FEMORAL NECK OSTEOPLASTY, CAPSULAR CLOSURE ([id number], [id number])     Surgeons and Role:     * Paul Roberts MD - Primary     Assistant(s):  PA: Wayne Laura PA-C     Skilled assistance was needed for patient positioning, prepping and draping, instrument holding and passing, retracting, and suturing.     Surgical Findings: See detailed operative report     Specimen: None     Estimated Blood Loss: Blood Output: 25 mL (10/10/2024 10:15 AM)    Indications for Procedure:  Rogelio Cortés is a 24 year old male with 6 months of right hip pain that was found on radiographs to have well-preserved joint space significant and evidence of femoral acetabular impingement with right sided cam deformity.  An MR Arthrogram demonstrated the presence of the femoral acetabular impingement as well as a tear of the right acetabular labrum and well preserved chondral surfaces within the hip joint.  Rogelio had previously exhausted conservative measures including activity modification as well as physical therapy without improvement in his hip symptoms. We discussed risks, benefits, and alternatives to surgical intervention and the patient wished to proceed with right hip arthroscopic labral repair, femoral neck osteoplasty, and capsular closure.  Informed consent was obtained.     Description of Procedure:  The patient was met in the preoperative holding area where correct patient, correct site, correct side, correct procedure were all confirmed.  The patient was placed under anesthesia and placed in a supine position on the [place] orthopaedic table.  Preoperative antibiotics were given.  A preoperative  timeout was completed to WHO standards. All bony prominences were padded.  Traction was placed onto the nonoperative leg in a stabilizing position then gross traction was applied to the right lower extremity.  An air arthrogram was performed under sterile conditions and confirmed under intraoperative fluoroscopy with atraumatic distention of the joint visualized.  Final traction was then applied with greater than 10 mm of joint distention visualized fluoroscopically.  The hip was then prepped and draped in the usual sterile surgical fashion and shower curtain dressing applied to the operative side.  A standard anterior lateral portal was then used to establish access into the joint under fluoroscopic guidance so as to confirm that the labrum was not violated.  The [employer] cannulated access system was then used and a 5.0 mm cannula was inserted over Nitinol guidewire under fluoroscopic guidance.  The Nitinol wire was removed and found to be intact.  70 degree arthroscope was then inserted and the joint surface visualized.  A mid anterior portal was then established under fluoroscopic guidance with direct visualization as the needle entered the joint.  A 5.0 mm cannula was then inserted over Nitinol guidewire for establishment of the mid anterior portal.  The camera was placed into the mid anterior portal and anterior lateral portal placement was confirmed to be through the capsule and outside of the acetabular labrum.  Camera was then brought back to the anterior lateral portal and samurai blade was inserted into the mid anterior portal for interportal capsulotomy.  The camera was then exchanged and the samurai placed into the anterior lateral portal for pericapsulotomy.  A labral tear was then visualized in the 1:00 to 3 o'clock position with associated wave sign type delamination injury to the chondral surface of the acetabular dome at the chondral labral junction.  Shaver was introduced and the labral tear was  debrided down to a stable base.  Injector was then used to place retraction stitches in the acetabular leaflet of the incised capsule. The ablator was then introduced and the superior reflection of the capsular leaflet was reflected off of the superior acetabulum adjacent to the labrum. The area of chondral instability was debrided with use of arthroscopic biter and shaver back down to stable base.     At this time attention was turned to the labral repair.  A distal anterior lateral accessory portal was then established under needle localization and fluoroscopic guidance.  A access cannula was then introduced over Nitinol wire via [employer] cannulated access system.  A 1.0 mm labral tape was then passed circumferentially around the labrum and without destabilizing the labral chondral junction.  This was placed into an anchor, and anchor socket was then drilled adjacent to the labral tape superiorly in the anterior acetabulum under direct visualization with no noted injury to the acetabular cartilage surface.  The anchor was then introduced into the joint and secured in place with appropriate eversion of the acetabular labrum.  This process was completed sequentially and a 3 anchor repair was performed. At this time the labrum was deemed to be appropriately repaired and peripheral borders were stable.  Final debridement of the chondral labral junction was completed with the use of shaver and electrocautery.  Traction was let down and appropriate suction seal was confirmed.  Total traction time was 55 minutes.      Attention was then turned to the femoral neck osteoplasty.  With the use of 5.0 mm round bur and intraoperative fluoroscopic guidance the hip was taken through a range of motion and a femoral neck was debrided back down to an appropriate level and contour to reestablish the appropriate spherical femoral orientation.  Evaluation was completed at sequential positions of 0 degrees of flexion and neutral  rotation, 30 degrees of internal and external rotation, 50 degrees of flexion and neutral rotation, 50 degrees of flexion and 40 and 60 degrees of external rotation.  Upon completion of the resection the appropriate head neck offset and contour had been reestablished in all planes and normal alpha angle was achieved.  Attention was then turned to capsular closure.  The injector was then used to place simple stitches in the acetabular and femoral leaflets of the prior periportal capsulotomy and closed in sequential fashion. The joint was then suctioned and capsular incisions were injected with 0.25% Marcaine with epinephrine.  Incisions were then closed with 3-0 nylon in interrupted fashion and sterile dressings were placed.  The patient was awakened from anesthesia, transferred to the postoperative care unit and left the operating room in stable condition.  There were no complications.     Paul Roberts MD  10/10/2024

## 2024-10-10 NOTE — BRIEF OP NOTE
Pre-Operative Diagnosis: TEAR OF ACETABULAR LABRUM RIGHT HIP, FEMOROACETABULAR IMPRINGEMENT RIGHT HIP     Post-Operative Diagnosis: TEAR OF ACETABULAR LABRUM RIGHT HIP, FEMOROACETABULAR IMPRINGEMENT RIGHT HIP      Procedure Performed:   RIGHT HIP ARTHROSCOPY, LABRAL REPAIR, FEMORAL NECK OSTEOPLASTY, CAPSULAR CLOSURE ([id number], [id number])    Surgeons and Role:     * Paul Roberts MD - Primary    Assistant(s):  PA: Wayne Laura PA-C    Skilled assistance was needed for patient positioning, prepping and draping, instrument holding and passing, retracting, and suturing.     Surgical Findings: See detailed operative report     Specimen: None     Estimated Blood Loss: Blood Output: 25 mL (10/10/2024 10:15 AM)    Paul Roberts MD  10/10/2024  10:32 AM

## 2024-10-10 NOTE — ANESTHESIA PROCEDURE NOTES
Airway  Date/Time: 10/10/2024 7:27 AM  Urgency: elective      General Information and Staff    Patient location during procedure: OR  Anesthesiologist: Christopher Telles MD  Performed: anesthesiologist   Performed by: Christopher Telles MD  Authorized by: Christopher Telles MD      Indications and Patient Condition  Indications for airway management: anesthesia  Sedation level: deep  Preoxygenated: yes  Patient position: sniffing  Mask difficulty assessment: 1 - vent by mask    Final Airway Details  Final airway type: supraglottic airway      Successful airway: classic  Size 4       Number of attempts at approach: 1

## 2024-10-10 NOTE — ANESTHESIA PREPROCEDURE EVALUATION
PRE-OP EVALUATION    Patient Name: Rogelio Cortés    Admit Diagnosis: TEAR OF ACETABULAR LABRUM RIGHT HIP, FEMOROACETABULAR IMPRINGEMENT RIGHT HIP    Pre-op Diagnosis: TEAR OF ACETABULAR LABRUM RIGHT HIP, FEMOROACETABULAR IMPRINGEMENT RIGHT HIP    RIGHT HIP ARTHROSCOPY, LABRAL REPAIR, FEMORAL NECK OSTEOPLASTY, CAPSULAR CLOSURE    Anesthesia Procedure: RIGHT HIP ARTHROSCOPY, LABRAL REPAIR, FEMORAL NECK OSTEOPLASTY, CAPSULAR CLOSURE (Right)    Surgeons and Role:     * Paul Roberts MD - Primary    Pre-op vitals reviewed.  Temp: 99.1 °F (37.3 °C)  Pulse: 84  Resp: 16  BP: 108/71  SpO2: 96 %  Body mass index is 23.71 kg/m².    Current medications reviewed.  Hospital Medications:   acetaminophen (Tylenol Extra Strength) tab 1,000 mg  1,000 mg Oral Once    scopolamine (Transderm-Scop) 1 MG/3DAYS patch 1 patch  1 patch Transdermal Once    lactated ringers infusion   Intravenous Continuous    ceFAZolin (Ancef) 2g in 10mL IV syringe premix  2 g Intravenous Once       Outpatient Medications:   Prescriptions Prior to Admission[1]    Allergies: Latex and Augmentin [amoxicillin-pot clavulanate]      Anesthesia Evaluation        Anesthetic Complications  (+) history of anesthetic complications  History of: PONV       GI/Hepatic/Renal    Negative GI/hepatic/renal ROS.                             Cardiovascular    Negative cardiovascular ROS.    Exercise tolerance: good     MET: >4                                           Endo/Other    Negative endo/other ROS.                              Pulmonary    Negative pulmonary ROS.                       Neuro/Psych                   (+) headaches                   Past Surgical History:   Procedure Laterality Date    Adenoidectomy      Arthroscopy of joint unlisted      LEFT HIP    Tonsillectomy      [place] teeth removed       Social History     Socioeconomic History    Marital status: Single   Tobacco Use    Smoking status: Never    Smokeless tobacco: Never   Vaping Use     Vaping status: Never Used   Substance and Sexual Activity    Alcohol use: No    Drug use: No     History   Drug Use No     Available pre-op labs reviewed.               Airway      Mallampati: II  Mouth opening: 3 FB  TM distance: 4 - 6 cm  Neck ROM: full Cardiovascular      Rhythm: regular  Rate: normal     Dental    Dentition appears grossly intact         Pulmonary    Pulmonary exam normal.                 Other findings              ASA: 2   Plan: general  NPO status verified and patient meets guidelines.    Post-procedure pain management plan discussed with surgeon and patient.      Plan/risks discussed with: patient, father and mother  Use of blood product(s) discussed with: patient, father and mother    Consented to blood products.          Present on Admission:  **None**             [1]   Medications Prior to Admission   Medication Sig Dispense Refill Last Dose/Taking    Multiple Vitamins-Minerals (MULTI-VITAMIN/MINERALS) Oral Tab Take 1 tablet by mouth daily.   Taking    Adapalene (DIFFERIN) 0.3 % External Gel Apply to face every night, rice sized amount mix with moisturizing cream. (Patient taking differently: as needed. Apply to face every night, rice sized amount mix with moisturizing cream.) 45 g 3 Taking    [DISCONTINUED] HYDROcodone-acetaminophen 5-325 MG Oral Tab Take 1 tablet by mouth every 6 (six) hours as needed. (Patient not taking: Reported on 9/24/2024) 16 tablet 0 Not Taking    [DISCONTINUED] naproxen 500 MG Oral Tab Take 1 tablet (500 mg total) by mouth 2 (two) times daily with meals. Take for 3 weeks after surgery (Patient not taking: Reported on 9/24/2024) 42 tablet 1 Not Taking    Verapamil HCl 40 MG Oral Tab Take 1 tablet (40 mg total) by mouth daily. (Patient not taking: Reported on 9/24/2024)   Not Taking

## 2024-10-10 NOTE — DISCHARGE INSTRUCTIONS
Paul Roberts MD  [facility]  Orthopaedic Surgery - Sports Medicine      Post Operative Instructions: Hip Arthroscopy with Labral Repair or Reconstruction    WEIGHT BEARING / MOVEMENT  You are FLAT FOOT weight bearing for the first 3 weeks after surgery and then 50% weight bearing for the 4th week; it is required that you use crutches for 4 weeks post-operatively to provide you with extra stability, to protect your hip, and to give the muscles around the hip the ability to rest. Do not hyperextend or hyperflex your hip, as this will be painful. Only move your hip within a pain-free range of motion.      The day after surgery, if you have access to a stationary bike, we encourage you to provide gentle motion to the hip. You may ride a stationary bike twice a day for 5-10 minutes (no resistance). This is only to provide motion to the hip and is not intended to stress the muscles around the hip girdle. Keep hip flexion to less than 90 degrees during these exercises.     Physical therapy will begin 7-10 days post-op after your first post-op visit.    ICE  You should use ice packs over the surgical site regularly throughout the day to help decrease swelling and pain. Make sure to have a layer between the ice and your knee so as to not injury your skin. Ice should be applied in intervals of 20-30 minutes on and off, and should be applied regularly for a minimum of 2 weeks following surgery. Do not apply ice to the area when you are asleep or at risk of falling asleep as     MEDICATIONS  An injection of local anesthesia was injected into your hip after the completion of the operation. This medication will wear in 6-12 hours. To control your pain during the transition while the anesthetic is wearing off, you should start the use of your pain medication, Norco, as you feel is needed.      You have also been given an anti-inflammatory called Naprosyn. You should begin taking this medication the night of surgery. You  are to take 500mg (1 tablet) two times a day. This medication serves two purposes: it will help reduce the amount of narcotic pain medication needed post-operatively, and it will prevent bone from re-growing (heterotopic ossification) around the hip. You are to take this medication for the first 21 days post-op.    A low dose of losartan has been prescribed to reduce the risk of post-operative fibrosis (scar tissue). This should be taken once daily for the first 2 weeks post-op.    Take Aspirin 325mg once daily starting the day after surgery for 3 weeks to help prevent the formation of blood clots.    If your are still having severe pain despite the Naprosyn, begin taking the pain medication (Norco) as prescribed.    An anti-nausea medication, Zofran, was prescribed for you and should be taken on an as needed basis, as the pain medication can cause nausea. To minimize this side effect, you should take your pain medication with some food.    DRESSING / BANDAGES:  Your hip dressing is NOT waterproof. Three days after surgery you may remove the hip dressing, and cover the areas with waterproof band-aids. It is then okay to shower with the waterproof band-aids. Do not scrub or wash with soap the area around the incisions for 3 weeks following surgery, just let water run over the areas. Similarly, during these 3 weeks, do not apply lotions or creams to the area around your incisions.     Do not take a bath or submerge your knee in water until your incisions are checked by me at your post-operative appointment and fully healed with all stitches removed. This is typically 2-3 weeks after surgery.    TEMPERATURE  It is normal to have an elevated temperature during the first 2-3 days post-operatively. Please call our office if your temperature is above 101F, if there is increased redness around the incision sites, or if there is increased drainage from the incision sites.    DRIVING  You may drive 2-3 weeks after surgery if  you are not taking narcotic pain medication. If your right leg is the operative side, then you must have good control of your leg prior to driving.    APPOINTMENT  It is likely that my surgical scheduler, Lou, has already scheduled a post-operative visit for you. This should occur 10-14 days after surgery. At that visit you will be given a prescription for physical therapy.    You make experience some low back pain due to muscle spasm from the traction applied during positioning to be able to perform your hip surgery. If so, apply a heating pad to the area of discomfort.    If you have any difficulty using the anti-inflammatory medications or aspirin or have a history of peptic ulcer disease, please let me know.      You received a drug called Toradol which is an anti inflammatory at : 10:15am do not take any anti inflammatory medications like Motrin, alive or ibuprofen until after: 4:15pm    If you are allowed to take anti inflammatories   Please report any suspected allergic reactions or bleeding issues to your doctor.

## 2025-07-17 ENCOUNTER — APPOINTMENT (OUTPATIENT)
Dept: GENERAL RADIOLOGY | Facility: HOSPITAL | Age: 25
End: 2025-07-17
Attending: ORTHOPAEDIC SURGERY
Payer: COMMERCIAL

## 2025-07-17 ENCOUNTER — ANESTHESIA (OUTPATIENT)
Dept: SURGERY | Facility: HOSPITAL | Age: 25
End: 2025-07-17
Payer: COMMERCIAL

## 2025-07-17 ENCOUNTER — HOSPITAL ENCOUNTER (OUTPATIENT)
Facility: HOSPITAL | Age: 25
Setting detail: HOSPITAL OUTPATIENT SURGERY
Discharge: HOME | End: 2025-07-17
Attending: ORTHOPAEDIC SURGERY | Admitting: ORTHOPAEDIC SURGERY
Payer: COMMERCIAL

## 2025-07-17 ENCOUNTER — ANESTHESIA EVENT (OUTPATIENT)
Dept: SURGERY | Facility: HOSPITAL | Age: 25
End: 2025-07-17
Payer: COMMERCIAL

## 2025-07-17 VITALS
WEIGHT: 165 LBS | RESPIRATION RATE: 16 BRPM | HEIGHT: 72 IN | BODY MASS INDEX: 22.35 KG/M2 | TEMPERATURE: 99 F | SYSTOLIC BLOOD PRESSURE: 119 MMHG | OXYGEN SATURATION: 97 % | DIASTOLIC BLOOD PRESSURE: 82 MMHG | HEART RATE: 104 BPM

## 2025-07-17 DIAGNOSIS — S73.191A TEAR OF RIGHT ACETABULAR LABRUM, INITIAL ENCOUNTER: Primary | ICD-10-CM

## 2025-07-17 PROCEDURE — 76000 FLUOROSCOPY <1 HR PHYS/QHP: CPT | Performed by: ORTHOPAEDIC SURGERY

## 2025-07-17 DEVICE — FG, CINCHLOCK SS ANCHOR WITH INSERTER
Type: IMPLANTABLE DEVICE | Site: HIP | Status: FUNCTIONAL
Brand: CINCHLOCK

## 2025-07-17 RX ORDER — NAPROXEN 500 MG/1
TABLET ORAL
COMMUNITY
Start: 2025-06-09 | End: 2025-07-17

## 2025-07-17 RX ORDER — HYDROCODONE BITARTRATE AND ACETAMINOPHEN 5; 325 MG/1; MG/1
1 TABLET ORAL EVERY 6 HOURS PRN
Qty: 16 TABLET | Refills: 0 | Status: SHIPPED | OUTPATIENT
Start: 2025-07-17

## 2025-07-17 RX ORDER — NAPROXEN 500 MG/1
500 TABLET ORAL 2 TIMES DAILY WITH MEALS
Qty: 42 TABLET | Refills: 0 | Status: SHIPPED | OUTPATIENT
Start: 2025-07-17

## 2025-07-17 RX ORDER — SENNOSIDES 8.6 MG
325 CAPSULE ORAL DAILY
Qty: 21 TABLET | Refills: 0 | Status: SHIPPED | OUTPATIENT
Start: 2025-07-17

## 2025-07-17 RX ORDER — HYDROMORPHONE HYDROCHLORIDE 1 MG/ML
0.2 INJECTION, SOLUTION INTRAMUSCULAR; INTRAVENOUS; SUBCUTANEOUS EVERY 5 MIN PRN
Refills: 0 | Status: DISCONTINUED | OUTPATIENT
Start: 2025-07-17 | End: 2025-07-17

## 2025-07-17 RX ORDER — SCOPOLAMINE 1 MG/3D
1 PATCH, EXTENDED RELEASE TRANSDERMAL ONCE
Status: DISCONTINUED | OUTPATIENT
Start: 2025-07-17 | End: 2025-07-17 | Stop reason: HOSPADM

## 2025-07-17 RX ORDER — DIAZEPAM 10 MG/2ML
5 INJECTION, SOLUTION INTRAMUSCULAR; INTRAVENOUS ONCE
Status: DISCONTINUED | OUTPATIENT
Start: 2025-07-17 | End: 2025-07-17

## 2025-07-17 RX ORDER — ONDANSETRON 2 MG/ML
INJECTION INTRAMUSCULAR; INTRAVENOUS AS NEEDED
Status: DISCONTINUED | OUTPATIENT
Start: 2025-07-17 | End: 2025-07-17 | Stop reason: SURG

## 2025-07-17 RX ORDER — KETOROLAC TROMETHAMINE 30 MG/ML
INJECTION, SOLUTION INTRAMUSCULAR; INTRAVENOUS AS NEEDED
Status: DISCONTINUED | OUTPATIENT
Start: 2025-07-17 | End: 2025-07-17 | Stop reason: SURG

## 2025-07-17 RX ORDER — DEXAMETHASONE SODIUM PHOSPHATE 4 MG/ML
VIAL (ML) INJECTION AS NEEDED
Status: DISCONTINUED | OUTPATIENT
Start: 2025-07-17 | End: 2025-07-17 | Stop reason: SURG

## 2025-07-17 RX ORDER — HYDROMORPHONE HYDROCHLORIDE 1 MG/ML
0.4 INJECTION, SOLUTION INTRAMUSCULAR; INTRAVENOUS; SUBCUTANEOUS EVERY 5 MIN PRN
Refills: 0 | Status: DISCONTINUED | OUTPATIENT
Start: 2025-07-17 | End: 2025-07-17

## 2025-07-17 RX ORDER — LIDOCAINE HYDROCHLORIDE 10 MG/ML
INJECTION, SOLUTION EPIDURAL; INFILTRATION; INTRACAUDAL; PERINEURAL AS NEEDED
Status: DISCONTINUED | OUTPATIENT
Start: 2025-07-17 | End: 2025-07-17 | Stop reason: SURG

## 2025-07-17 RX ORDER — ACETAMINOPHEN 500 MG
1000 TABLET ORAL ONCE
Status: DISCONTINUED | OUTPATIENT
Start: 2025-07-17 | End: 2025-07-17 | Stop reason: HOSPADM

## 2025-07-17 RX ORDER — LOSARTAN POTASSIUM 25 MG/1
25 TABLET ORAL DAILY
Qty: 14 TABLET | Refills: 0 | Status: SHIPPED | OUTPATIENT
Start: 2025-07-17

## 2025-07-17 RX ORDER — MELOXICAM 15 MG/1
15 TABLET ORAL DAILY
COMMUNITY
Start: 2024-01-15

## 2025-07-17 RX ORDER — HYDROMORPHONE HYDROCHLORIDE 1 MG/ML
INJECTION, SOLUTION INTRAMUSCULAR; INTRAVENOUS; SUBCUTANEOUS
Status: COMPLETED
Start: 2025-07-17 | End: 2025-07-17

## 2025-07-17 RX ORDER — ONDANSETRON 4 MG/1
4 TABLET, FILM COATED ORAL EVERY 8 HOURS PRN
Qty: 16 TABLET | Refills: 0 | Status: SHIPPED | OUTPATIENT
Start: 2025-07-17

## 2025-07-17 RX ORDER — NALOXONE HYDROCHLORIDE 0.4 MG/ML
0.08 INJECTION, SOLUTION INTRAMUSCULAR; INTRAVENOUS; SUBCUTANEOUS AS NEEDED
Status: DISCONTINUED | OUTPATIENT
Start: 2025-07-17 | End: 2025-07-17

## 2025-07-17 RX ORDER — PROCHLORPERAZINE EDISYLATE 5 MG/ML
5 INJECTION INTRAMUSCULAR; INTRAVENOUS EVERY 8 HOURS PRN
Status: DISCONTINUED | OUTPATIENT
Start: 2025-07-17 | End: 2025-07-17

## 2025-07-17 RX ORDER — HYDROMORPHONE HYDROCHLORIDE 1 MG/ML
0.6 INJECTION, SOLUTION INTRAMUSCULAR; INTRAVENOUS; SUBCUTANEOUS EVERY 5 MIN PRN
Refills: 0 | Status: DISCONTINUED | OUTPATIENT
Start: 2025-07-17 | End: 2025-07-17

## 2025-07-17 RX ORDER — ACETAMINOPHEN 500 MG
1000 TABLET ORAL ONCE AS NEEDED
Status: COMPLETED | OUTPATIENT
Start: 2025-07-17 | End: 2025-07-17

## 2025-07-17 RX ORDER — TRANEXAMIC ACID 10 MG/ML
1000 INJECTION, SOLUTION INTRAVENOUS ONCE
Status: COMPLETED | OUTPATIENT
Start: 2025-07-17 | End: 2025-07-17

## 2025-07-17 RX ORDER — SODIUM CHLORIDE, SODIUM LACTATE, POTASSIUM CHLORIDE, CALCIUM CHLORIDE 600; 310; 30; 20 MG/100ML; MG/100ML; MG/100ML; MG/100ML
INJECTION, SOLUTION INTRAVENOUS CONTINUOUS
Status: DISCONTINUED | OUTPATIENT
Start: 2025-07-17 | End: 2025-07-17

## 2025-07-17 RX ORDER — FEXOFENADINE HCL 180 MG/1
180 TABLET ORAL DAILY
COMMUNITY

## 2025-07-17 RX ORDER — BUPIVACAINE HYDROCHLORIDE AND EPINEPHRINE 5; 5 MG/ML; UG/ML
INJECTION, SOLUTION EPIDURAL; INTRACAUDAL; PERINEURAL AS NEEDED
Status: DISCONTINUED | OUTPATIENT
Start: 2025-07-17 | End: 2025-07-17 | Stop reason: HOSPADM

## 2025-07-17 RX ORDER — ONDANSETRON 2 MG/ML
4 INJECTION INTRAMUSCULAR; INTRAVENOUS EVERY 6 HOURS PRN
Status: DISCONTINUED | OUTPATIENT
Start: 2025-07-17 | End: 2025-07-17

## 2025-07-17 RX ORDER — HYDROCODONE BITARTRATE AND ACETAMINOPHEN 5; 325 MG/1; MG/1
1 TABLET ORAL ONCE AS NEEDED
Refills: 0 | Status: COMPLETED | OUTPATIENT
Start: 2025-07-17 | End: 2025-07-17

## 2025-07-17 RX ORDER — HYDROCODONE BITARTRATE AND ACETAMINOPHEN 5; 325 MG/1; MG/1
2 TABLET ORAL ONCE AS NEEDED
Refills: 0 | Status: COMPLETED | OUTPATIENT
Start: 2025-07-17 | End: 2025-07-17

## 2025-07-17 RX ORDER — MEPERIDINE HYDROCHLORIDE 25 MG/ML
12.5 INJECTION INTRAMUSCULAR; INTRAVENOUS; SUBCUTANEOUS ONCE
Refills: 0 | Status: COMPLETED | OUTPATIENT
Start: 2025-07-17 | End: 2025-07-17

## 2025-07-17 RX ORDER — MEPERIDINE HYDROCHLORIDE 25 MG/ML
INJECTION INTRAMUSCULAR; INTRAVENOUS; SUBCUTANEOUS
Status: COMPLETED
Start: 2025-07-17 | End: 2025-07-17

## 2025-07-17 RX ADMIN — LIDOCAINE HYDROCHLORIDE 50 MG: 10 INJECTION, SOLUTION EPIDURAL; INFILTRATION; INTRACAUDAL; PERINEURAL at 12:17:00

## 2025-07-17 RX ADMIN — DEXAMETHASONE SODIUM PHOSPHATE 4 MG: 4 MG/ML VIAL (ML) INJECTION at 13:56:00

## 2025-07-17 RX ADMIN — ONDANSETRON 4 MG: 2 INJECTION INTRAMUSCULAR; INTRAVENOUS at 15:13:00

## 2025-07-17 RX ADMIN — SODIUM CHLORIDE, SODIUM LACTATE, POTASSIUM CHLORIDE, CALCIUM CHLORIDE: 600; 310; 30; 20 INJECTION, SOLUTION INTRAVENOUS at 15:55:00

## 2025-07-17 RX ADMIN — KETOROLAC TROMETHAMINE 30 MG: 30 INJECTION, SOLUTION INTRAMUSCULAR; INTRAVENOUS at 15:13:00

## 2025-07-17 RX ADMIN — TRANEXAMIC ACID 1000 MG: 10 INJECTION, SOLUTION INTRAVENOUS at 12:30:00

## 2025-07-17 NOTE — ANESTHESIA PREPROCEDURE EVALUATION
PRE-OP EVALUATION    Patient Name: Rogelio Cortés    Admit Diagnosis: FEMOROACETABULAR IMPINGEMENT; TEAR OF RIGHT ACETABULAR LABRUM    Pre-op Diagnosis: FEMOROACETABULAR IMPINGEMENT; TEAR OF RIGHT ACETABULAR LABRUM    RIGHT HIP ARTHROSCOPY, REVISION LABRAL REPAIR, REVISION FEMORAL NECK OSTEOPLASTY    Anesthesia Procedure: RIGHT HIP ARTHROSCOPY, REVISION LABRAL REPAIR, REVISION FEMORAL NECK OSTEOPLASTY (Right)    Surgeons and Role:     * Paul Roberts MD - Primary    Pre-op vitals reviewed.  Temp: 98.1 °F (36.7 °C)  Pulse: 96  Resp: 16  BP: 115/82  SpO2: 100 %  Body mass index is 22.38 kg/m².    Current medications reviewed.  Hospital Medications:  Current Medications[1]    Outpatient Medications:   Prescriptions Prior to Admission[2]    Allergies: Latex and Augmentin [amoxicillin-pot clavulanate]      Anesthesia Evaluation    Patient summary reviewed.    Anesthetic Complications  (+) history of anesthetic complications  History of: PONV       GI/Hepatic/Renal    Negative GI/hepatic/renal ROS.                             Cardiovascular    Negative cardiovascular ROS.                                                   Endo/Other                           (+) arthritis       Pulmonary    Negative pulmonary ROS.                       Neuro/Psych                   (+) headaches                   Past Surgical History[3]  Social Hx on file[4]  History   Drug Use No     Available pre-op labs reviewed.               Airway      Mallampati: I  Mouth opening: >3 FB  TM distance: > 6 cm  Neck ROM: full Cardiovascular    Cardiovascular exam normal.  Rhythm: regular  Rate: normal     Dental    Dentition appears grossly intact         Pulmonary    Pulmonary exam normal.  Breath sounds clear to auscultation bilaterally.               Other findings              ASA: 1   Plan: general  NPO status verified and patient meets guidelines.    Post-procedure pain management plan discussed with surgeon and patient.  Surgeon  requests: regional block    Plan/risks discussed with: patient, father and mother                Present on Admission:  **None**             [1]    acetaminophen (Tylenol Extra Strength) tab 1,000 mg  1,000 mg Oral Once    scopolamine (Transderm-Scop) 1 MG/3DAYS patch 1 patch  1 patch Transdermal Once    lactated ringers infusion   Intravenous Continuous    tranexamic acid in sodium chloride 0.7% (Cyklokapron) 1000 mg/100mL infusion premix 1,000 mg  1,000 mg Intravenous Once    ceFAZolin (Ancef) 2g in 10mL IV syringe premix  2 g Intravenous Once   [2]   Medications Prior to Admission   Medication Sig Dispense Refill Last Dose/Taking    naproxen 500 MG Oral Tab TAKE 1 TABLET BY MOUTH 2 (TWO) TIMES DAILY FOR 3 WEEKS   Past Month    Multiple Vitamins-Minerals (MULTI-VITAMIN/MINERALS) Oral Tab Take 1 tablet by mouth in the morning.   7/16/2025    Adapalene (DIFFERIN) 0.3 % External Gel Apply to face every night, rice sized amount mix with moisturizing cream. (Patient taking differently: as needed. Apply to face every night, rice sized amount mix with moisturizing cream.) 45 g 3 Taking Differently    fexofenadine 180 MG Oral Tab Take 1 tablet (180 mg total) by mouth daily.   More than a month    Meloxicam 15 MG Oral Tab Take 1 tablet (15 mg total) by mouth daily.   More than a month   [3]   Past Surgical History:  Procedure Laterality Date    Adenoidectomy      Arthroscopy of joint unlisted      LEFT HIP    Tonsillectomy      [place] teeth removed     [4]   Social History  Socioeconomic History    Marital status: Single   Tobacco Use    Smoking status: Never    Smokeless tobacco: Never   Vaping Use    Vaping status: Never Used   Substance and Sexual Activity    Alcohol use: No    Drug use: No

## 2025-07-17 NOTE — ANESTHESIA PROCEDURE NOTES
Peripheral IV  Date/Time: 7/17/2025 12:16 PM  Inserted by: Judd Bonner MD    Placement  Needle size: 20 G  Laterality: left  Location: hand  Local anesthetic: none  Site prep: alcohol  Technique: anatomical landmarks  Attempts: 1

## 2025-07-17 NOTE — ANESTHESIA PROCEDURE NOTES
Airway  Date/Time: 7/17/2025 12:22 PM  Reason: elective    Airway not difficult    General Information and Staff   Patient location during procedure: OR  Anesthesiologist: Judd Bonner MD  Performed: anesthesiologist   Performed by: Judd Bonner MD  Authorized by: Judd Bonner MD        Indications and Patient Condition  Indications for airway management: anesthesia  Sedation level: deep      Preoxygenated: yesPatient position: sniffing    Mask difficulty assessment: 1 - vent by mask    Final Airway Details    Final airway type: supraglottic airway      Successful airway: classic  Size: 4     Number of attempts at approach: 1  Number of other approaches attempted: 0

## 2025-07-17 NOTE — DISCHARGE INSTRUCTIONS
Paul Roberts MD  [facility]  Orthopaedic Surgery - Sports Medicine      Post Operative Instructions: Hip Arthroscopy with Labral Repair or Reconstruction    WEIGHT BEARING / MOVEMENT  You are FLAT FOOT weight bearing for the first 3 weeks after surgery and then 50% weight bearing for the 4th week; it is required that you use crutches for 4 weeks post-operatively to provide you with extra stability, to protect your hip, and to give the muscles around the hip the ability to rest. Do not hyperextend or hyperflex your hip, as this will be painful. Only move your hip within a pain-free range of motion.  Keep the brace on at all times with the exception of showering.     The day after surgery, if you have access to a stationary bike, we encourage you to provide gentle motion to the hip. You may ride a stationary bike twice a day for 5-10 minutes (no resistance). This is only to provide motion to the hip and is not intended to stress the muscles around the hip girdle. Keep hip flexion to less than 90 degrees during these exercises.     Physical therapy will begin 7-10 days post-op after your first post-op visit.    ICE  You should use ice packs over the surgical site regularly throughout the day to help decrease swelling and pain. Make sure to have a layer between the ice and your knee so as to not injury your skin. Ice should be applied in intervals of 20-30 minutes on and off, and should be applied regularly for a minimum of 2 weeks following surgery. Do not apply ice to the area when you are asleep or at risk of falling asleep as     MEDICATIONS  An injection of local anesthesia was injected into your hip after the completion of the operation. This medication will wear in 6-12 hours. To control your pain during the transition while the anesthetic is wearing off, you should start the use of your pain medication, Norco, as you feel is needed.      You have also been given an anti-inflammatory called Naprosyn.  You should begin taking this medication the night of surgery. You are to take 500mg (1 tablet) two times a day. This medication serves two purposes: it will help reduce the amount of narcotic pain medication needed post-operatively, and it will prevent bone from re-growing (heterotopic ossification) around the hip. You are to take this medication for the first 21 days post-op.    A low dose of losartan has been prescribed to reduce the risk of post-operative fibrosis (scar tissue). This should be taken once daily for the first 2 weeks post-op.    Take Aspirin 325mg once daily starting the day after surgery for 3 weeks to help prevent the formation of blood clots.    If your are still having severe pain despite the Naprosyn, begin taking the pain medication (Norco) as prescribed.    An anti-nausea medication, Zofran, was prescribed for you and should be taken on an as needed basis, as the pain medication can cause nausea. To minimize this side effect, you should take your pain medication with some food.    DRESSING / BANDAGES:  Your hip dressing is NOT waterproof. Three days after surgery you may remove the hip dressing, and cover the areas with waterproof band-aids. It is then okay to shower with the waterproof band-aids. Do not scrub or wash with soap the area around the incisions for 3 weeks following surgery, just let water run over the areas. Similarly, during these 3 weeks, do not apply lotions or creams to the area around your incisions.     Do not take a bath or submerge your knee in water until your incisions are checked by me at your post-operative appointment and fully healed with all stitches removed. This is typically 2-3 weeks after surgery.    TEMPERATURE  It is normal to have an elevated temperature during the first 2-3 days post-operatively. Please call our office if your temperature is above 101F, if there is increased redness around the incision sites, or if there is increased drainage from the  incision sites.    DRIVING  You may drive 2-3 weeks after surgery if you are not taking narcotic pain medication. If your right leg is the operative side, then you must have good control of your leg prior to driving.    APPOINTMENT  It is likely that my surgical scheduler, Lou, has already scheduled a post-operative visit for you. This should occur 10-14 days after surgery. At that visit you will be given a prescription for physical therapy.    You make experience some low back pain due to muscle spasm from the traction applied during positioning to be able to perform your hip surgery. If so, apply a heating pad to the area of discomfort.    If you have any difficulty using the anti-inflammatory medications or aspirin or have a history of peptic ulcer disease, please let me know.    You received a drug called Toradol which is an Anti Inflammatory at: 3:15 pm   Do not take any Anti Inflammatory like Motrin, Advil, Aleve or Ibuprofen until after: 9:15 pm   Please report any suspected allergic reactions or bleeding issues to your doctor

## 2025-07-17 NOTE — ANESTHESIA POSTPROCEDURE EVALUATION
[facility] Мария Cortés Patient Status:  Hospital Outpatient Surgery   Age/Gender 25 year old male MRN [id number]   Location [facility] POST ANESTHESIA CARE UNIT Attending Paul Roberts MD   Hosp Day # 0 PCP Keron Deleon MD       Anesthesia Post-op Note    RIGHT HIP ARTHROSCOPY, REVISION LABRAL REPAIR, REVISION FEMORAL NECK OSTEOPLASTY    Procedure Summary       Date: 07/17/25 Room / Location:  MAIN OR  /  MAIN OR    Anesthesia Start: 1211 Anesthesia Stop: 1555    Procedure: RIGHT HIP ARTHROSCOPY, REVISION LABRAL REPAIR, REVISION FEMORAL NECK OSTEOPLASTY (Right: Hip) Diagnosis: (FEMOROACETABULAR IMPINGEMENT; TEAR OF RIGHT ACETABULAR LABRUM)    Surgeons: Paul Roberts MD Anesthesiologist: Judd Bonner MD    Anesthesia Type: general ASA Status: 1            Anesthesia Type: general    Vitals Value Taken Time   /71 07/17/25 15:53   Temp 98.3 07/17/25 15:57   Pulse 98 07/17/25 15:56   Resp 12 07/17/25 15:56   SpO2 97 % 07/17/25 15:56   Vitals shown include unfiled device data.        Patient Location: PACU    Anesthesia Type: general    Airway Patency: patent    Postop Pain Control: adequate    Mental Status: mildly sedated but able to meaningfully participate in the post-anesthesia evaluation    Nausea/Vomiting: none    Cardiopulmonary/Hydration status: stable euvolemic    Complications: no apparent anesthesia related complications    Postop vital signs: stable    Dental Exam: Unchanged from Preop    Patient to be discharged from PACU when criteria met.          
No

## 2025-07-17 NOTE — H&P
ORTHOPEDIC SURGERY H&P     Patient Name:Rogelio Cortés  Date of Appointment: 7/17/2025    Chief Complaint: Patient presents with:  Left Hip - Follow - Up, Pain  Right Hip - Follow - Up, Pain    HPI:   The patient is a 25 year old male 1 year out from right hip arthroscopy with labral repair, FNO, capsular closure in June and 8 months out from left hip arthroscopy with labral repair, femoral neck osteoplasty, capsular closure on 10/10/2024. Continues to notice recurrence of anterior bilateral hip pain symptoms since reinjury onset 2.5 months ago. He states pain both with activity and deep flexion exercise as well as prolonged sitting. Pain predominantly in the anterior hip though there is some C-spine radiation bilaterally. Limited improvement with rest and prn advil. Limited sustained improvement with dedicated naproxen.    Physical Exam:     There were no vitals taken for this visit.    Constitutional: NAD, well-developed, well-nourished.  Neuro: SILT throughout the extremity. EHL/PF/DF.  MSK:   left hip:   Dressings removed. Surgical incisions are well healed. Incisions have no surrounding erythema, purulent drainage, palpable fluctuance. Normal postoperative swelling. Calf compartment is soft and compressible, non-tender. No pain with log roll. Extremity is NVI with 2+ DP pulses. No pain with flexion to 100, no pain with IR/ER, positive FADIR bilaterally.    Diagnostic Studies:     Postoperative radiographs of bilateral hips including AP, lateral, false profile demonstrate no acute fracture or dislocation. There is incomplete cam resection visualized on the right side. There is improved sphericity visualized on the left hip. No evidence of degenerative change or arthritic wear.    MR arthrogram of the right hip dated 5/28/2025 demonstrates postsurgical changes with concern for residual tear through the anterior superior labrum. There are considerable changes with cam resection of the there is residual cam seen  to the anterior femoral neck resulting in loss of adequate femoral head neck offset. No significant degenerative changes seen to the hip girdle musculature. There is no evidence of dehiscence of prior capsulotomy repair.    MR arthrogram of the left hip dated 5/14/2025 demonstrates postsurgical changes as prior labral repair with area concerning for possible root tear. There are changes consistent with prior femoral neck osteoplasty with restoration of appropriate femoral head neck junction offset. No evidence of discrete dehiscence of prior capsulotomy repair. No evidence of degenerative change seen to the hip joint.    Assessment:     25 year old year old male presents with the following diagnoses:    1. Status post arthroscopy of hip    2. Chronic right hip pain    3. Femoroacetabular impingement of right hip    4. Tear of right acetabular labrum, subsequent encounter    5. Tear of left acetabular labrum, subsequent encounter    Plan:     Plan:   - To OR for right hip arthroscopy, revision labral pair, revision femoral neck osteoplasty, capsular closure    Paul Roberts MD  [facility]  Orthopedic Surgery, Sports Medicine

## 2025-07-18 NOTE — OPERATIVE REPORT
[facility]   part of [facility]    Operative Note         Rogelio Cortés Location: OR   CSN [id number] MRN [id number]   Admission Date 7/17/2025 Operation Date 7/17/2025   Attending Physician No att. providers found       Patient Name: Rogelio Cortés     Preoperative Diagnosis: FEMOROACETABULAR IMPINGEMENT; TEAR OF RIGHT ACETABULAR LABRUM     Postoperative Diagnosis: FEMOROACETABULAR IMPINGEMENT; TEAR OF RIGHT ACETABULAR LABRUM     Procedure(s):  RIGHT HIP ARTHROSCOPY, REVISION LABRAL REPAIR, REVISION FEMORAL NECK OSTEOPLASTY ([id number] - [id number], [id number])    Primary Surgeon: Paul Roberts MD     PA: Wayne Laura PA-C     Skilled assistance was needed for patient positioning, prepping and draping, instrument holding and passing, retracting, and suturing.    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  An MR Arthrogram demonstrated appearance concerning for recurrent tear of the right acetabular labrum.  Rogelio had previously exhausted conservative measures including activity modification as well as physical therapy without improvement in his hip symptoms. We discussed risks, benefits, and alternatives to surgical intervention and the patient wished to proceed with right hip arthroscopic revision labral repair, femoral neck osteoplasty, and capsular closure.  Informed consent was obtained.     Surgical Findings: Recurrent tear of the acetabular labrum    Operative Summary:    The patient was met in the preoperative holding area where correct patient, correct site, correct side, correct procedure were all confirmed.   The patient was placed under anesthesia and placed in a supine position on the [place] orthopaedic table.  Preoperative antibiotics were given.  A preoperative timeout was completed to WHO standards. All bony prominences were padded.  Traction was placed onto the nonoperative leg in a stabilizing position then gross traction was applied to the right lower extremity.  An air arthrogram was performed under sterile conditions and confirmed under intraoperative fluoroscopy with atraumatic distention of the joint visualized.  Final traction was then applied with greater than 10 mm of joint distention visualized fluoroscopically.  The hip was then prepped and draped in the usual sterile surgical fashion and shower curtain dressing applied to the operative side.  A standard anterior lateral portal was then used to establish access into the joint under fluoroscopic guidance so as to confirm that the labrum was not violated.  The [employer] cannulated access system was then used and a 5.0 mm cannula was inserted over Nitinol guidewire under fluoroscopic guidance.  The Nitinol wire was removed and found to be intact.  70 degree arthroscope was then inserted and the joint surface visualized.  A mid anterior portal was then established under fluoroscopic guidance with direct visualization as the needle entered the joint.  A 5.0 mm cannula was then inserted over Nitinol guidewire for establishment of the mid anterior portal.  The camera was placed into the mid anterior portal and anterior lateral portal placement was confirmed to be through the capsule and outside of the acetabular labrum.  Camera was then brought back to the anterior lateral portal and samurai blade was inserted into the mid anterior portal for interportal capsulotomy. There was thickening present to the prior capsular repair. The camera was then exchanged and the samurai placed into the anterior lateral portal for pericapsulotomy.  Injector was then used to place  retraction stitches in the acetabular leaflet of the incised capsule. The ablator was then introduced and the superior reflection of the capsular leaflet was reflected off of the superior acetabulum adjacent to the labrum. This was done slowly given the presence of numerous scarred adhesions between the acetabular capsular reflection and the acetabular labrum so as to no damage the labral tissue. Area of prior labral repair was probed and a recurrent labral tear was then visualized in the 11:00 o'clock position with associated wave sign type delamination injury to the chondral surface of the acetabular dome at the chondral labral junction. Shaver was introduced and the labral tear was debrided down to a stable base.       At this time attention was turned to the labral repair.  A distal anterior lateral accessory portal was then established under needle localization and fluoroscopic guidance.  A access cannula was then introduced over Nitinol wire via Turbocoating cannulated access system.  A 1.0 mm labral tape was then passed circumferentially around the labrum and without destabilizing the labral chondral junction.  This was placed into an anchor, and anchor socket was then drilled adjacent to the labral tape superiorly in the anterior acetabulum under direct visualization with no noted injury to the acetabular cartilage surface.  The anchor was then introduced into the joint and secured in place with appropriate eversion of the acetabular labrum.  This process was completed sequentially and a 2 anchor repair was performed. At this time the labrum was deemed to be appropriately repaired and peripheral borders were stable.  Final debridement of the chondral labral junction was completed with the use of shaver and electrocautery.  Traction was let down and appropriate suction seal was confirmed.  Total traction time was 75 minutes.      Attention was then turned to the revision femoral neck osteoplasty.  With the use of  5.0 mm round bur and intraoperative fluoroscopic guidance the hip was taken through a range of motion and a femoral neck was debrided back down to an appropriate level and contour to reestablish the appropriate spherical femoral orientation.  Evaluation was completed at sequential positions of 0 degrees of flexion and neutral rotation, 30 degrees of internal and external rotation, 50 degrees of flexion and neutral rotation, 50 degrees of flexion and 40 and 60 degrees of external rotation.  Upon completion of the resection the appropriate head neck offset and contour had been reestablished in all planes and normal alpha angle was achieved as confirmed by the [employer] HipCheck system.  Attention was then turned to capsular closure.  The SlingShot was then used to place simple stitches in the acetabular and femoral leaflets of the prior periportal capsulotomy and closed in sequential fashion. The joint was then suctioned and capsular incisions were injected with 0.25% Marcaine with epinephrine.  Incisions were then closed with 3-0 nylon in interrupted fashion and sterile dressings were placed.  The patient was awakened from anesthesia, transferred to the postoperative care unit and left the operating room in stable condition.  There were no complications.    Given the revision nature of this procedure with diffuse fibrosis of capsular reflection and capsulotomy, extension of prior tear, operative through altered surgical field given revision nature, need to perform capsulotomy extension for femoral neck osteoplasty, and working around previously placed anchor devices, the performed labral repair and femoral neck osteoplasty was more technically demanding and more time consuming that described by CPT code 17362.     Implants:   Implant Name Type Inv. Item Serial No.  Lot No. LRB No. Used Action   ANCHOR SUT DIA2.4MM W/ INSRT FOR KNTLSS TRIP - SN/A  ANCHOR SUT DIA2.4MM W/ INSRT FOR KNTLSS TRIP N/A [employer]  [id number] Right 1 Implanted   ANCHOR SUT DIA2.4MM W/ INSRT FOR KNTLSS TRIP - SN/A  ANCHOR SUT DIA2.4MM W/ INSRT FOR KNTLSS TRIP N/A Christin Massiel [id number] Right 1 Implanted        Drains: None    Condition: None       Paul Roberts MD

## (undated) DEVICE — #11 STERILE BLADE: Brand: POLYMER COATED BLADES

## (undated) DEVICE — GLOVE SUR 7.5 SENSICARE PI PIP CRM PWD F

## (undated) DEVICE — INJECTOR II NEEDLE CARTRIDGE: Brand: INJECTOR

## (undated) DEVICE — TUBING PMP L16FT DISP INFLOW

## (undated) DEVICE — C-ARM: Brand: UNBRANDED

## (undated) DEVICE — SAMURAI BLADE FULL RADIUS: Brand: SAMURAI

## (undated) DEVICE — XL, ARTHROSCOPIC SHAVER BLADES, DIAMOND ROUND BUR.  DO NOT RESTERILIZE, DO NOT USE IF PACKAGE IS DAMAGED, KEEP DRY, KEEP AWAY FROM SUNLIGHT: Brand: CROSSBLADE

## (undated) DEVICE — FLOWPORT II CANNULA WITH OBTURATOR STRYKER 165MM: Brand: FLOWPORT

## (undated) DEVICE — MEDI-VAC NON-CONDUCTIVE SUCTION TUBING: Brand: CARDINAL HEALTH

## (undated) DEVICE — PACK PBDS HIP PINNING

## (undated) DEVICE — 50-S SWEEP + XL, SUCTION PROBE, NON-BENDABLE, XL/HIP LENGTH: Brand: SERFAS ENERGY

## (undated) DEVICE — TRANSPORT CANNULA 8MM LENGTH 4, 5, 6: Brand: TRANSPORT

## (undated) DEVICE — GLOVE SUR 8 SENSICARE PI PIP GRN PWD F

## (undated) DEVICE — SOLUTION IRRIG 3000ML 0.9% NACL FLX CONT

## (undated) DEVICE — GLOVE SUR 8 SENSICARE PI PIP CRM PWD F

## (undated) DEVICE — NANOPASS REACH CRESCENT: Brand: NANOPASS

## (undated) DEVICE — COVER,MAYO STAND,STERILE: Brand: MEDLINE

## (undated) DEVICE — SUT ETHLN 3-0 18IN PS-2 NABSRB BLK 19MM 3/8 C

## (undated) DEVICE — Device

## (undated) DEVICE — PADDING CAST 4INX4YD 100% COT SFT SLF BOND

## (undated) DEVICE — OCCLUSIVE GAUZE STRIP OVERWRAP,3% BISMUTH TRIBROMOPHENATE IN PETROLATUM BLEND: Brand: XEROFORM

## (undated) DEVICE — BANDAGE COMPR 4INX5YD WHT/BGE POLY COT WV E

## (undated) DEVICE — SUT ETHLN 3-0 18IN PS-1 NABSRB BLK 24MM 3/8 C

## (undated) DEVICE — BANDAGE COHESIVE 4INX5YD TAN E POR SLF ADH

## (undated) DEVICE — SLINGSHOT 70 UP: Brand: SLINGSHOT

## (undated) DEVICE — COVER,LIGHT,CAMERA,HARD,1/PK,STRL: Brand: MEDLINE

## (undated) DEVICE — SLEEVE COMPR MD KNEE LEN SGL USE KENDALL SCD

## (undated) DEVICE — SUT MCRYL 3-0 27IN ABSRB UD 19MM PS-2 3/8

## (undated) DEVICE — BANDAGE ACE COMP 4INX5YD UNSTERILE

## (undated) DEVICE — 3M™ STERI-STRIP™ REINFORCED ADHESIVE SKIN CLOSURES, R1547, 1/2 IN X 4 IN (12 MM X 100 MM), 6 STRIPS/ENVELOPE: Brand: 3M™ STERI-STRIP™

## (undated) DEVICE — PADDING CAST 6INX4YD 100% COT ABSRB HIGHLY

## (undated) DEVICE — XL TOMCAT, HIP CUTTER. ARTHROSCOPIC SHAVER BLADE. FOR USE WITH: REF 0375-701-500, 0375-704-500, 0375-708-500. DO NOT USE IF PACKAGE IS DAMAGED, KEEP DRY, KEEP AWAY FROM SUNLIGHT: Brand: FORMULA

## (undated) DEVICE — PORTAL ENTRY KIT

## (undated) DEVICE — 3M™ STERI-DRAPE™ U-DRAPE 1015: Brand: STERI-DRAPE™

## (undated) DEVICE — BANDAGE,COHESIVE,TAN,4X5YD,LF,STRL: Brand: MEDLINE

## (undated) DEVICE — TUBING PMP L16FT DISP

## (undated) DEVICE — SUT MCRYL+ 3-0 27IN PS-2 ABSRB UD ANTIBACT 19

## (undated) DEVICE — SUT ZIPLINE 2 36IN NABSRB BLK WHT COBRAID

## (undated) DEVICE — XEROFORM OCCLUSIVE GAUZE STRIP OVERWRAP, 3% BISMUTH TRIBROMOPHENATE IN PETROLATUM BLEND: Brand: XEROFORM

## (undated) DEVICE — HIP INSTRUMENTATION SYS USEAGE

## (undated) DEVICE — COVER LT HNDL RIG FOR SUR CAM DISP

## (undated) DEVICE — CINCHLOCK SS DRILL BIT: Brand: CINCHLOCK

## (undated) DEVICE — PADDING CAST 6INX4YD RAYON UNDERCAST

## (undated) DEVICE — NEEDLE SPNL 18GA L3.5IN PNK QNCKE STYL DISP

## (undated) NOTE — LETTER
CLARIFICATION FOR E-SSS    To: Surgery Scheduling for Dr. Roberts     Patient Name: Rogeilo Cortés  -Age / Sex: 3/30/2000-A: 24 y  male   Medical Records: [id number] [facility]: [id number]      Procedure Description:  RIGHT HIP ARTHROSCOPY, LABRAL REPAIR, FEMORAL NECK OSTEOPLASTY, CAPSULAR CLOSURE    Please note that clarification is needed on the Electronic-Surgery Scheduling Sheet (E-SSS)  the original is included with this letter. Please have physician review and make changes on the faxed copy of the E-SSS.    Please review and submit change if needed    Other: Gender marked as female on SSS, please revise    ALL CHANGES MUST BE DOCUMENTED ON THE E-SSS AND SIGNED BY THE PHYSICIAN    After physician has made the changes and signed the E-SSS please fax it to [phone] and these changes will then be made in Epic by the OR schedulers.     If you have any questions please call Pre-Admission Testing at [phone]    Thank you